# Patient Record
Sex: MALE | Race: WHITE | NOT HISPANIC OR LATINO | Employment: FULL TIME | URBAN - METROPOLITAN AREA
[De-identification: names, ages, dates, MRNs, and addresses within clinical notes are randomized per-mention and may not be internally consistent; named-entity substitution may affect disease eponyms.]

---

## 2018-04-06 ENCOUNTER — APPOINTMENT (OUTPATIENT)
Dept: GENERAL RADIOLOGY | Facility: HOSPITAL | Age: 53
End: 2018-04-06

## 2018-04-06 ENCOUNTER — HOSPITAL ENCOUNTER (EMERGENCY)
Facility: HOSPITAL | Age: 53
Discharge: HOME | End: 2018-04-06
Attending: FAMILY MEDICINE | Admitting: FAMILY MEDICINE

## 2018-04-06 VITALS
TEMPERATURE: 97.7 F | OXYGEN SATURATION: 99 % | HEIGHT: 72 IN | RESPIRATION RATE: 16 BRPM | BODY MASS INDEX: 28.44 KG/M2 | WEIGHT: 210 LBS | DIASTOLIC BLOOD PRESSURE: 92 MMHG | SYSTOLIC BLOOD PRESSURE: 149 MMHG | HEART RATE: 70 BPM

## 2018-04-06 DIAGNOSIS — M10.9 GOUTY ARTHRITIS: Primary | ICD-10-CM

## 2018-04-06 PROCEDURE — 99283 EMERGENCY DEPT VISIT LOW MDM: CPT

## 2018-04-06 PROCEDURE — 73560 X-RAY EXAM OF KNEE 1 OR 2: CPT

## 2018-04-06 RX ORDER — COLCHICINE 0.6 MG/1
0.6 TABLET ORAL DAILY
Qty: 10 TABLET | Refills: 0 | OUTPATIENT
Start: 2018-04-06 | End: 2020-03-13

## 2018-04-06 RX ORDER — CEPHALEXIN 500 MG/1
500 CAPSULE ORAL 3 TIMES DAILY
Qty: 28 CAPSULE | Refills: 0 | OUTPATIENT
Start: 2018-04-06 | End: 2020-03-13

## 2018-04-06 RX ORDER — PREDNISONE 20 MG/1
20 TABLET ORAL 3 TIMES DAILY
Qty: 30 TABLET | Refills: 0 | OUTPATIENT
Start: 2018-04-06 | End: 2020-03-13

## 2018-04-06 RX ORDER — HYDROCODONE BITARTRATE AND ACETAMINOPHEN 5; 325 MG/1; MG/1
1 TABLET ORAL EVERY 4 HOURS PRN
Qty: 12 TABLET | Refills: 0 | OUTPATIENT
Start: 2018-04-06 | End: 2020-03-13

## 2018-04-06 NOTE — ED NOTES
Pt states that he woke up yesterday with knee pain. Pt denies injury.      Kimberly Velázquez RN  04/06/18 [id number]

## 2018-04-06 NOTE — ED NOTES
Pt states that he feels better and ready to go. Pt AA0x3, ambulatory to waiting room with ABCs intact. Prescriptions in hand at discharge.      Kimberly Velázquez RN  04/06/18 [id number]

## 2018-04-06 NOTE — ED PROVIDER NOTES
" EMERGENCY DEPARTMENT ENCOUNTER    CHIEF COMPLAINT  Chief Complaint: Knee Pain  History given by: Patient  History limited by: None  Room Number: 53/53  PMD: No Known Provider      HPI:  Pt is a 52 y.o. male with a h/o gout who presents complaining of L knee pain that began yesterday morning and progressively worsened throughout the day. Ibuprofen and ice did not relieve pain. Patient denies excessive use prior to the beginning of pain. He has a history of at least 2 gout attacks but not in the knee.    Duration:  Since yesterday morning  Onset: gradual  Timing: constant  Location: L knee  Radiation: none stated  Quality: \"pain\"  Intensity/Severity: moderate  Progression: worsening  Associated Symptoms: none stated  Aggravating Factors: none stated  Alleviating Factors: none stated  Previous Episodes: none stated  Treatment before arrival: ibuprofen and ice have not relieved pain    PAST MEDICAL HISTORY  Active Ambulatory Problems     Diagnosis Date Noted   • No Active Ambulatory Problems     Resolved Ambulatory Problems     Diagnosis Date Noted   • No Resolved Ambulatory Problems     Past Medical History:   Diagnosis Date   • Gout        PAST SURGICAL HISTORY  Past Surgical History:   Procedure Laterality Date   • APPENDECTOMY     • VASECTOMY         FAMILY HISTORY  History reviewed. No pertinent family history.    SOCIAL HISTORY  Social History     Social History   • Marital status:      Spouse name: N/A   • Number of children: N/A   • Years of education: N/A     Occupational History   • Not on file.     Social History Main Topics   • Smoking status: Never Smoker   • Smokeless tobacco: Not on file   • Alcohol use Yes      Comment: occassionally   • Drug use: No   • Sexual activity: Not on file     Other Topics Concern   • Not on file     Social History Narrative   • No narrative on file       ALLERGIES  Review of patient's allergies indicates no known allergies.    REVIEW OF SYSTEMS  Review of Systems "   Constitutional: Negative for activity change, appetite change and fever.   HENT: Negative for congestion and sore throat.    Respiratory: Negative for cough and shortness of breath.    Cardiovascular: Negative for chest pain and leg swelling.   Gastrointestinal: Negative for abdominal pain, diarrhea and vomiting.   Genitourinary: Negative for decreased urine volume and dysuria.   Musculoskeletal: Positive for myalgias (L knee). Negative for neck pain.   Skin: Negative for rash and wound.   Neurological: Negative for weakness, numbness and headaches.   All other systems reviewed and are negative.      PHYSICAL EXAM  ED Triage Vitals   Temp Heart Rate Resp BP SpO2   04/06/18 0949 04/06/18 0949 04/06/18 0949 04/06/18 1035 04/06/18 0949   98.3 °F (36.8 °C) 86 18 (!) 182/87 100 %      Temp src Heart Rate Source Patient Position BP Location FiO2 (%)   04/06/18 0949 -- -- -- --   Tympanic           Physical Exam   Constitutional: No distress.   HENT:   Head: Normocephalic and atraumatic.   Eyes: EOM are normal.   Neck: Normal range of motion.   Pulmonary/Chest: No respiratory distress.   Abdominal: There is no tenderness.   Musculoskeletal: He exhibits no edema.   1 cm firm nodule on the lateral aspect of the L knee, possibly in the quadriceps tendon. ROM limited by pain. Ligaments seem stable. Area of redness over lateral patella with diffuse swelling.    Neurological: He is alert.   Skin: Skin is warm and dry.   Nursing note and vitals reviewed.      RADIOLOGY  XR Knee 1 or 2 View Left     No acute process        I ordered the above noted radiological studies. Interpreted by radiologist. Reviewed by me in PACS.       PROCEDURES  Procedures      PROGRESS AND CONSULTS  ED Course   11:52 AM  With a h/o gout x 2, suspect this is associated with gouty arthritis. Advised patient to f/u with an orthopedist. XR is unremarkable. Discussed plan to place an ace wrap and d/c with gout meds and an abx since I cannot r/o a septic  issue. Advised patient to apply ice to alleviate pain. Pt understands and agrees with the plan. All questions answered.    MEDICAL DECISION MAKING  Results were reviewed/discussed with the patient and they were also made aware of online access. Pt also made aware that some labs, such as cultures, will not be resulted during ER visit and follow up with PMD is necessary.     MDM  Number of Diagnoses or Management Options  Gouty arthritis:      Amount and/or Complexity of Data Reviewed  Tests in the radiology section of CPT®: ordered and reviewed (Radiology results are unremarkable)  Decide to obtain previous medical records or to obtain history from someone other than the patient: yes  Independent visualization of images, tracings, or specimens: yes    Patient Progress  Patient progress: stable         DIAGNOSIS  Final diagnoses:   Gouty arthritis       DISPOSITION  DISCHARGE    Patient discharged in stable condition.    Reviewed implications of results, diagnosis, meds, responsibility to follow up, warning signs and symptoms of possible worsening, potential complications and reasons to return to ER, including new or worsening sx.    Patient/Family voiced understanding of above instructions.    Discussed plan for discharge, as there is no emergent indication for admission. Patient referred to primary care provider for BP management due to today's BP. Pt/family is agreeable and understands need for follow up and repeat testing.  Pt is aware that discharge does not mean that nothing is wrong but it indicates no emergency is present that requires admission and they must continue care with follow-up as given below or physician of their choice.     FOLLOW-UP  Anish Levy MD  [address] [facility] [address]  [place] [id number] [phone]    Schedule an appointment as soon as possible for a visit in 3 days  For Follow up         Medication List      New Prescriptions    cephalexin 500 MG capsule  Commonly known  as:  KEFLEX  Take 1 capsule by mouth 3 (Three) Times a Day.     colchicine 0.6 MG tablet  Take 1 tablet by mouth Daily.     HYDROcodone-acetaminophen 5-325 MG per tablet  Commonly known as:  NORCO  Take 1 tablet by mouth Every 4 (Four) Hours As Needed (1).     predniSONE 20 MG tablet  Commonly known as:  DELTASONE  Take 1 tablet by mouth 3 (Three) Times a Day.              Latest Documented Vital Signs:  As of 12:01 PM  BP- 140/92 HR- 86 Temp- 98.3 °F (36.8 °C) (Tympanic) O2 sat- 100%    --  Documentation assistance provided by pepe Wolfe for Dr. Garvey.  Information recorded by the scribe was done at my direction and has been verified and validated by me.         Cyndi Wolfe  04/06/18 2439       Devyn Garvey MD  04/06/18 4433

## 2018-04-06 NOTE — ED TRIAGE NOTES
"Patient states \"My left knee started aching and hurting yesterday and got to the point where I couldn't walk on it.\" Patient ambulatory at triage and in NAD. Pt denies injury.  "

## 2020-03-13 ENCOUNTER — TELEPHONE (OUTPATIENT)
Dept: NEUROSURGERY | Facility: CLINIC | Age: 55
End: 2020-03-13

## 2020-03-13 ENCOUNTER — APPOINTMENT (OUTPATIENT)
Dept: CT IMAGING | Facility: HOSPITAL | Age: 55
End: 2020-03-13

## 2020-03-13 ENCOUNTER — OFFICE VISIT (OUTPATIENT)
Dept: NEUROSURGERY | Facility: CLINIC | Age: 55
End: 2020-03-13

## 2020-03-13 ENCOUNTER — HOSPITAL ENCOUNTER (EMERGENCY)
Facility: HOSPITAL | Age: 55
Discharge: HOME | End: 2020-03-13
Attending: EMERGENCY MEDICINE | Admitting: EMERGENCY MEDICINE

## 2020-03-13 ENCOUNTER — APPOINTMENT (OUTPATIENT)
Dept: GENERAL RADIOLOGY | Facility: HOSPITAL | Age: 55
End: 2020-03-13

## 2020-03-13 VITALS
HEIGHT: 72 IN | SYSTOLIC BLOOD PRESSURE: 156 MMHG | HEART RATE: 67 BPM | WEIGHT: 230 LBS | RESPIRATION RATE: 22 BRPM | OXYGEN SATURATION: 95 % | TEMPERATURE: 97.1 F | DIASTOLIC BLOOD PRESSURE: 89 MMHG | BODY MASS INDEX: 31.15 KG/M2

## 2020-03-13 VITALS
HEIGHT: 72 IN | WEIGHT: 230 LBS | DIASTOLIC BLOOD PRESSURE: 82 MMHG | HEART RATE: 70 BPM | BODY MASS INDEX: 31.15 KG/M2 | SYSTOLIC BLOOD PRESSURE: 138 MMHG

## 2020-03-13 DIAGNOSIS — M54.12 LEFT CERVICAL RADICULOPATHY: ICD-10-CM

## 2020-03-13 DIAGNOSIS — M54.12 CERVICAL RADICULOPATHY: Primary | ICD-10-CM

## 2020-03-13 DIAGNOSIS — M50.30 DEGENERATIVE DISC DISEASE, CERVICAL: Primary | ICD-10-CM

## 2020-03-13 LAB
ALBUMIN SERPL-MCNC: 4.4 G/DL (ref 3.5–5.2)
ALBUMIN/GLOB SERPL: 1.9 G/DL
ALP SERPL-CCNC: 53 U/L (ref 39–117)
ALT SERPL W P-5'-P-CCNC: 47 U/L (ref 1–41)
ANION GAP SERPL CALCULATED.3IONS-SCNC: 12.7 MMOL/L (ref 5–15)
AST SERPL-CCNC: 33 U/L (ref 1–40)
BASOPHILS # BLD AUTO: 0.03 10*3/MM3 (ref 0–0.2)
BASOPHILS NFR BLD AUTO: 0.5 % (ref 0–1.5)
BILIRUB SERPL-MCNC: 0.6 MG/DL (ref 0.2–1.2)
BUN BLD-MCNC: 13 MG/DL (ref 6–20)
BUN/CREAT SERPL: 14 (ref 7–25)
CALCIUM SPEC-SCNC: 9 MG/DL (ref 8.6–10.5)
CHLORIDE SERPL-SCNC: 104 MMOL/L (ref 98–107)
CO2 SERPL-SCNC: 23.3 MMOL/L (ref 22–29)
CREAT BLD-MCNC: 0.93 MG/DL (ref 0.76–1.27)
DEPRECATED RDW RBC AUTO: 48.6 FL (ref 37–54)
EOSINOPHIL # BLD AUTO: 0.08 10*3/MM3 (ref 0–0.4)
EOSINOPHIL NFR BLD AUTO: 1.3 % (ref 0.3–6.2)
ERYTHROCYTE [DISTWIDTH] IN BLOOD BY AUTOMATED COUNT: 13.6 % (ref 12.3–15.4)
GFR SERPL CREATININE-BSD FRML MDRD: 84 ML/MIN/1.73
GLOBULIN UR ELPH-MCNC: 2.3 GM/DL
GLUCOSE BLD-MCNC: 124 MG/DL (ref 65–99)
HCT VFR BLD AUTO: 48.5 % (ref 37.5–51)
HGB BLD-MCNC: 16.5 G/DL (ref 13–17.7)
IMM GRANULOCYTES # BLD AUTO: 0.01 10*3/MM3 (ref 0–0.05)
IMM GRANULOCYTES NFR BLD AUTO: 0.2 % (ref 0–0.5)
LYMPHOCYTES # BLD AUTO: 1.83 10*3/MM3 (ref 0.7–3.1)
LYMPHOCYTES NFR BLD AUTO: 30 % (ref 19.6–45.3)
MCH RBC QN AUTO: 32.9 PG (ref 26.6–33)
MCHC RBC AUTO-ENTMCNC: 34 G/DL (ref 31.5–35.7)
MCV RBC AUTO: 96.8 FL (ref 79–97)
MONOCYTES # BLD AUTO: 0.52 10*3/MM3 (ref 0.1–0.9)
MONOCYTES NFR BLD AUTO: 8.5 % (ref 5–12)
NEUTROPHILS # BLD AUTO: 3.63 10*3/MM3 (ref 1.7–7)
NEUTROPHILS NFR BLD AUTO: 59.5 % (ref 42.7–76)
NRBC BLD AUTO-RTO: 0 /100 WBC (ref 0–0.2)
PLATELET # BLD AUTO: 238 10*3/MM3 (ref 140–450)
PMV BLD AUTO: 10.4 FL (ref 6–12)
POTASSIUM BLD-SCNC: 4.4 MMOL/L (ref 3.5–5.2)
PROT SERPL-MCNC: 6.7 G/DL (ref 6–8.5)
RBC # BLD AUTO: 5.01 10*6/MM3 (ref 4.14–5.8)
SODIUM BLD-SCNC: 140 MMOL/L (ref 136–145)
TROPONIN T SERPL-MCNC: <0.01 NG/ML (ref 0–0.03)
WBC NRBC COR # BLD: 6.1 10*3/MM3 (ref 3.4–10.8)

## 2020-03-13 PROCEDURE — 25010000002 MORPHINE PER 10 MG: Performed by: EMERGENCY MEDICINE

## 2020-03-13 PROCEDURE — 96375 TX/PRO/DX INJ NEW DRUG ADDON: CPT

## 2020-03-13 PROCEDURE — 84484 ASSAY OF TROPONIN QUANT: CPT | Performed by: EMERGENCY MEDICINE

## 2020-03-13 PROCEDURE — 71046 X-RAY EXAM CHEST 2 VIEWS: CPT

## 2020-03-13 PROCEDURE — 25010000002 ONDANSETRON PER 1 MG: Performed by: EMERGENCY MEDICINE

## 2020-03-13 PROCEDURE — 93005 ELECTROCARDIOGRAM TRACING: CPT | Performed by: EMERGENCY MEDICINE

## 2020-03-13 PROCEDURE — 72125 CT NECK SPINE W/O DYE: CPT

## 2020-03-13 PROCEDURE — 99283 EMERGENCY DEPT VISIT LOW MDM: CPT

## 2020-03-13 PROCEDURE — 80053 COMPREHEN METABOLIC PANEL: CPT | Performed by: EMERGENCY MEDICINE

## 2020-03-13 PROCEDURE — 85025 COMPLETE CBC W/AUTO DIFF WBC: CPT | Performed by: EMERGENCY MEDICINE

## 2020-03-13 PROCEDURE — 99204 OFFICE O/P NEW MOD 45 MIN: CPT | Performed by: NURSE PRACTITIONER

## 2020-03-13 PROCEDURE — 96376 TX/PRO/DX INJ SAME DRUG ADON: CPT

## 2020-03-13 PROCEDURE — 96374 THER/PROPH/DIAG INJ IV PUSH: CPT

## 2020-03-13 RX ORDER — OXYCODONE AND ACETAMINOPHEN 10; 325 MG/1; MG/1
1 TABLET ORAL EVERY 4 HOURS PRN
Qty: 18 TABLET | Refills: 0 | Status: SHIPPED | OUTPATIENT
Start: 2020-03-13 | End: 2021-07-02

## 2020-03-13 RX ORDER — NAPROXEN SODIUM 220 MG
440 TABLET ORAL 2 TIMES DAILY PRN
COMMUNITY
End: 2021-07-02

## 2020-03-13 RX ORDER — BACLOFEN 10 MG/1
10 TABLET ORAL 2 TIMES DAILY
Qty: 60 TABLET | Refills: 2 | Status: SHIPPED | OUTPATIENT
Start: 2020-03-13 | End: 2021-07-02

## 2020-03-13 RX ORDER — ONDANSETRON 2 MG/ML
4 INJECTION INTRAMUSCULAR; INTRAVENOUS ONCE
Status: COMPLETED | OUTPATIENT
Start: 2020-03-13 | End: 2020-03-13

## 2020-03-13 RX ORDER — SODIUM CHLORIDE 0.9 % (FLUSH) 0.9 %
10 SYRINGE (ML) INJECTION AS NEEDED
Status: DISCONTINUED | OUTPATIENT
Start: 2020-03-13 | End: 2020-03-13 | Stop reason: HOSPADM

## 2020-03-13 RX ORDER — MORPHINE SULFATE 2 MG/ML
4 INJECTION, SOLUTION INTRAMUSCULAR; INTRAVENOUS ONCE
Status: COMPLETED | OUTPATIENT
Start: 2020-03-13 | End: 2020-03-13

## 2020-03-13 RX ORDER — METHYLPREDNISOLONE 4 MG/1
TABLET ORAL
Qty: 1 EACH | Refills: 0 | Status: SHIPPED | OUTPATIENT
Start: 2020-03-13 | End: 2020-03-23

## 2020-03-13 RX ADMIN — MORPHINE SULFATE 4 MG: 2 INJECTION, SOLUTION INTRAMUSCULAR; INTRAVENOUS at 05:48

## 2020-03-13 RX ADMIN — MORPHINE SULFATE 4 MG: 2 INJECTION, SOLUTION INTRAMUSCULAR; INTRAVENOUS at 06:54

## 2020-03-13 RX ADMIN — ONDANSETRON 4 MG: 2 INJECTION INTRAMUSCULAR; INTRAVENOUS at 05:48

## 2020-03-13 NOTE — TELEPHONE ENCOUNTER
Per Dr AGUIRRE , he needs to see extender today to get MRI ordered.  Spoke with Chelsie Beatty and she will see him today at 1:15 pm- patient informed
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
## 2020-03-13 NOTE — ED TRIAGE NOTES
Pt to ED from home with reports of waking up two hours ago with left sided chest pain, left arm pain, and numbness to left hand.  Pt also c/o SOA.

## 2020-03-13 NOTE — ED PROVIDER NOTES
EMERGENCY DEPARTMENT ENCOUNTER    CHIEF COMPLAINT  Chief Complaint: arm pain  History given by: patient  History limited by: none  Room Number: 15/15  PMD: Provider, No Known      HPI:  Pt is a 54 y.o. male who presents complaining of L arm pain that has been intermittent for the past 2 weeks, but became constant and acutely worse about 2 hours PTA. The pain will radiate into his neck. Confirms associated numbness of the L arm, from his fingers to his wrist. Denies weakness. Pain is worsened with leaning his head back. No alleviating factors. Denies CP and SOA. He also notes mild nausea, but denies vomiting. Denies fever and chills. Denies personal cardiac Hx. Confirms family Hx of heart disease. He has been taking Aleve with some relief of his pain. Not currently on blood thinners. There are no other complaints.     PAST MEDICAL HISTORY  Active Ambulatory Problems     Diagnosis Date Noted   • No Active Ambulatory Problems     Resolved Ambulatory Problems     Diagnosis Date Noted   • No Resolved Ambulatory Problems     Past Medical History:   Diagnosis Date   • Gout    • Hypertension        PAST SURGICAL HISTORY  Past Surgical History:   Procedure Laterality Date   • APPENDECTOMY     • VASECTOMY         FAMILY HISTORY  History reviewed. No pertinent family history.    SOCIAL HISTORY  Social History     Socioeconomic History   • Marital status:      Spouse name: Not on file   • Number of children: Not on file   • Years of education: Not on file   • Highest education level: Not on file   Tobacco Use   • Smoking status: Never Smoker   • Smokeless tobacco: Never Used   Substance and Sexual Activity   • Alcohol use: Yes     Comment: occassionally   • Drug use: No       ALLERGIES  Patient has no known allergies.    REVIEW OF SYSTEMS  Review of Systems   Constitutional: Negative for activity change, appetite change and fever.   HENT: Negative for congestion and sore throat.    Eyes: Negative.    Respiratory:  Negative for cough and shortness of breath.    Cardiovascular: Negative for chest pain and leg swelling.   Gastrointestinal: Positive for nausea. Negative for abdominal pain, diarrhea and vomiting.   Endocrine: Negative.    Genitourinary: Negative for decreased urine volume and dysuria.   Musculoskeletal: Negative for neck pain.        (+) LUE pain   Skin: Negative for rash and wound.   Allergic/Immunologic: Negative.    Neurological: Positive for numbness (L hand). Negative for weakness and headaches.   Hematological: Negative.    Psychiatric/Behavioral: Negative.    All other systems reviewed and are negative.      PHYSICAL EXAM  ED Triage Vitals [03/13/20 0526]   Temp Heart Rate Resp BP SpO2   97.1 °F (36.2 °C) 95 22 -- 100 %      Temp src Heart Rate Source Patient Position BP Location FiO2 (%)   Tympanic -- -- -- --       Physical Exam   Constitutional: He is oriented to person, place, and time. No distress.   HENT:   Head: Normocephalic and atraumatic.   Eyes: Pupils are equal, round, and reactive to light. EOM are normal.   Neck: Normal range of motion. Neck supple. Muscular tenderness present.   Mild posterior neck tenderness down into the trapezius. Increased pain in the neck and LUE with extension of the neck.    Cardiovascular: Normal rate, regular rhythm and normal heart sounds.   Pulses:       Radial pulses are 2+ on the right side, and 2+ on the left side.   Pulmonary/Chest: Effort normal and breath sounds normal. No respiratory distress. He exhibits no tenderness.   Abdominal: Soft. There is no tenderness. There is no rebound and no guarding.   Musculoskeletal: Normal range of motion. He exhibits no edema.   Extremities are unremarkable.    Neurological: He is alert and oriented to person, place, and time. He has normal sensation and normal strength.   Skin: Skin is warm and dry.   Psychiatric: Affect normal. His mood appears anxious.   Nursing note and vitals reviewed.      LAB RESULTS  Lab Results  (last 24 hours)     Procedure Component Value Units Date/Time    Protime-INR [[id number]] Updated:  03/13/20 0620    Specimen:  Blood     CBC & Differential [[id number]] Collected:  03/13/20 0553    Specimen:  Blood Updated:  03/13/20 0612    Narrative:       The following orders were created for panel order CBC & Differential.  Procedure                               Abnormality         Status                     ---------                               -----------         ------                     CBC Auto Differential[[id number]]        Normal              Final result                 Please view results for these tests on the individual orders.    Comprehensive Metabolic Panel [[id number]]  (Abnormal) Collected:  03/13/20 0553    Specimen:  Blood Updated:  03/13/20 0632     Glucose 124 mg/dL      BUN 13 mg/dL      Creatinine 0.93 mg/dL      Sodium 140 mmol/L      Potassium 4.4 mmol/L      Chloride 104 mmol/L      CO2 23.3 mmol/L      Calcium 9.0 mg/dL      Total Protein 6.7 g/dL      Albumin 4.40 g/dL      ALT (SGPT) 47 U/L      AST (SGOT) 33 U/L      Alkaline Phosphatase 53 U/L      Total Bilirubin 0.6 mg/dL      eGFR Non African Amer 84 mL/min/1.73      Globulin 2.3 gm/dL      A/G Ratio 1.9 g/dL      BUN/Creatinine Ratio 14.0     Anion Gap 12.7 mmol/L     Narrative:       GFR Normal >60  Chronic Kidney Disease <60  Kidney Failure <15      Troponin [[id number]]  (Normal) Collected:  03/13/20 0553    Specimen:  Blood Updated:  03/13/20 0630     Troponin T <0.010 ng/mL     Narrative:       Troponin T Reference Range:  <= 0.03 ng/mL-   Negative for AMI  >0.03 ng/mL-     Abnormal for myocardial necrosis.  Clinicians would have to utilize clinical acumen, EKG, Troponin and serial changes to determine if it is an Acute Myocardial Infarction or myocardial injury due to an underlying chronic condition.       Results may be falsely decreased if patient taking Biotin.      CBC Auto Differential [[id number]]  (Normal)  Collected:  03/13/20 0553    Specimen:  Blood Updated:  03/13/20 0612     WBC 6.10 10*3/mm3      RBC 5.01 10*6/mm3      Hemoglobin 16.5 g/dL      Hematocrit 48.5 %      MCV 96.8 fL      MCH 32.9 pg      MCHC 34.0 g/dL      RDW 13.6 %      RDW-SD 48.6 fl      MPV 10.4 fL      Platelets 238 10*3/mm3      Neutrophil % 59.5 %      Lymphocyte % 30.0 %      Monocyte % 8.5 %      Eosinophil % 1.3 %      Basophil % 0.5 %      Immature Grans % 0.2 %      Neutrophils, Absolute 3.63 10*3/mm3      Lymphocytes, Absolute 1.83 10*3/mm3      Monocytes, Absolute 0.52 10*3/mm3      Eosinophils, Absolute 0.08 10*3/mm3      Basophils, Absolute 0.03 10*3/mm3      Immature Grans, Absolute 0.01 10*3/mm3      nRBC 0.0 /100 WBC           I ordered the above labs and reviewed the results    RADIOLOGY  Xr Chest 2 View    Result Date: 3/13/2020  EXAMINATION: 2 VIEWS OF THE CHEST  HISTORY: 54-year-old male with a history of left arm, left shoulder and neck pain.  FINDINGS: PA and lateral chest radiographs were obtained. No prior chest radiograph is available for comparison. The lungs are normal in volume and clear. The cardiomediastinal silhouette and pulmonary vasculature appear normal. No bony abnormality of the chest is appreciated.      Negative chest radiographs.  This report was finalized on 3/13/2020 6:55 AM by Dr. Arturo Rosa M.D.      Ct Cervical Spine Without Contrast    Result Date: 3/13/2020  CT CERVICAL SPINE WITHOUT CONTRAST  HISTORY: Neck pain, left radiculopathy.  COMPARISON: None.  FINDINGS: There is moderate-to-severe loss of disc height at C5-C6 and C6-C7. There is reversal of normal cervical lordosis with apex at the level of C5-C6.  C2-C3: There is a small central disc bulge with no evidence of herniation.  C3-C4: There is a broad-based disc osteophyte complex which is more prominent to the left. There is at least moderate neural foraminal compromise on the left secondary to uncovertebral degenerative disease and  extension of disc osteophyte complex into the neural foramen.  C4-C5: There is mild-to-moderate canal stenosis secondary to a broad-based central disc osteophyte complex. There is mild and moderate neural foraminal compromise on the left and right respectively secondary to uncovertebral degenerative disease.  C5-C6: There is moderate canal stenosis centrally secondary to a broad-based disc osteophyte complex. There is moderate-to-severe neural foraminal compromise bilaterally secondary to uncovertebral degenerative disease and extension of disc osteophyte complex into the neural foramen.  C6-C7: A broad-based disc osteophyte complex is present which is more prominent to the left. Beam hardening artifact from the patient's shoulders limits evaluation of the intraspinal contents. There is moderate-to-severe neural foraminal compromise bilaterally secondary to uncovertebral degenerative disease.  C7-T1: Beam hardening artifact from the patient's shoulders limits evaluation of the intraspinal contents. There is no evidence of uncovertebral degenerative disease.  There is no evidence of fracture.      Multilevel degenerative disease involving the cervical spine as described in detail above with reversal of the normal cervical lordosis, loss of disc height and multilevel neural foraminal compromise. Neural foraminal compromise on the left is most prominent at C3-C4, C5-C6 and C6-C7. There is artifact limiting evaluation of the intraspinal contents at C6-C7 and C7-T1. Further evaluation could be performed with MRI examination of the cervical spine as indicated.  The above information was called to and discussed with Dr. Bass.    Radiation dose reduction techniques were utilized, including automated exposure control and exposure modulation based on body size.         I ordered the above noted radiological studies. Reviewed by me. See dictation for official radiology interpretation.      PROCEDURES  Procedures  EKG           EKG time: 0532  Rhythm/Rate: NSR 82  P waves and OR: nml  QRS, axis: nml   ST and T waves: nonspecific ST changes     Interpreted Contemporaneously by me, independently viewed  No prior       PROGRESS AND CONSULTS     0705: Discussed patient with Dr. Bermudez (Neurosurgery) who will see patient in office today.    MEDICAL DECISION MAKING  Results were reviewed/discussed with the patient and they were also made aware of online access. Pt also made aware that some labs, such as cultures, will not be resulted during ER visit and follow up with PMD is necessary.     MDM  Number of Diagnoses or Management Options     Amount and/or Complexity of Data Reviewed  Clinical lab tests: ordered and reviewed  Tests in the radiology section of CPT®: ordered and reviewed  Tests in the medicine section of CPT®: ordered and reviewed (See EKG note.)  Decide to obtain previous medical records or to obtain history from someone other than the patient: yes  Independent visualization of images, tracings, or specimens: yes    Patient Progress  Patient progress: stable         DIAGNOSIS  Final diagnoses:   Cervical radiculopathy       DISPOSITION  DISCHARGED    Latest Documented Vital Signs:  As of 07:10  BP- 149/89 HR- 67 Temp- 97.1 °F (36.2 °C) (Tympanic) O2 sat- 95%    --  Documentation assistance provided by pepe Wood for Dr. Kali MD.  Information recorded by the scribe was done at my direction and has been verified and validated by me.       Deejay Wood  03/13/20 0559       Severiano Bass MD  03/13/20 0757

## 2020-03-15 ENCOUNTER — HOSPITAL ENCOUNTER (OUTPATIENT)
Dept: MRI IMAGING | Facility: HOSPITAL | Age: 55
Discharge: HOME | End: 2020-03-15
Admitting: NURSE PRACTITIONER

## 2020-03-15 DIAGNOSIS — M50.30 DEGENERATIVE DISC DISEASE, CERVICAL: ICD-10-CM

## 2020-03-15 DIAGNOSIS — M54.12 LEFT CERVICAL RADICULOPATHY: ICD-10-CM

## 2020-03-15 PROCEDURE — 72141 MRI NECK SPINE W/O DYE: CPT

## 2020-03-17 NOTE — PROGRESS NOTES
Subjective   Patient ID: Orion Pop is a 54 y.o. male is here today for follow-up to discuss cervical MRI results.  He has not had PT, MUSHTAQ or pain management.  He is in the process of completing MDP which has helped, he will be finished tomorrow. He has Percocet at home, but has not taken in 3 - 4 days, he is taking Baclofen BID    Patient was last seen 3.13.20 for left arm and neck pain.  Patient was seen in [facility] ER 3.13.20 for neck and left arm pain and was instructed to follow up in our office.    Patient states that he is currently having constant mild to moderate left arm pain, he is having very little neck pain and no arm weakness.  He has constant NT left hand/fingers.  He denies problems with his balance and gait and no urinary incontinence.    The patient is with his wife. 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 He has never really had any neck or arm symptoms. Late last week he woke up from sleep with severe left scapular and arm pain and some neck pian. He went to the emergency room and a CT scan was done which did show some foraminal stenosis at multiple cervical levels. He was given some pain medications and he saw that very day our nurse practitioner, Chelsie Beatty, who ordered a cervical MRI and gave a Medrol Dosepak. He returns. The pain is somewhat better although it is not gone. He has no motor deficits. I could not elicit any Spurling's sign although he said that turning his head did reproduce pain initially. He is not myelopathic. He does have root compression as described below at C5-C6 and C6-C7. I think it is reasonable to start with conservative treatments. I think it probably would be premature to start physical therapy since he is still pretty uncomfortable and has difficulty sleeping. Will try a single cervical epidural block and have him come back in 7-10 days. An off work statement will be given. If he is feeling better we can start some physical therapy at that  time and move forward with nonoperative treatment. He knows that surgery is a backup but I am reasonably optimistic that we can treat it this way. We can also consider gabapentin at some point later but the pain medications made him very sleepy and he might not tolerate that medicine.       Arm Pain    The pain is present in the left forearm and upper left arm. The pain does not radiate. The pain is at a severity of 5/10. The pain is moderate. The pain has been constant since the incident. Associated symptoms include numbness.       The following portions of the patient's history were reviewed and updated as appropriate: allergies, current medications, past family history, past medical history, past social history, past surgical history and problem list.    Review of Systems   Constitutional: Negative.    HENT: Negative.    Eyes: Negative.    Respiratory: Negative.    Cardiovascular: Negative.    Gastrointestinal: Negative.    Endocrine: Negative.    Genitourinary: Negative.  Negative for urgency.   Musculoskeletal: Negative for arthralgias, gait problem, joint swelling, myalgias, neck pain and neck stiffness.   Allergic/Immunologic: Negative.    Neurological: Positive for numbness. Negative for weakness.   Hematological: Negative.    Psychiatric/Behavioral: Negative.        Objective   Physical Exam   Constitutional: He is oriented to person, place, and time. He appears well-developed and well-nourished.   HENT:   Head: Normocephalic and atraumatic.   Eyes: Pupils are equal, round, and reactive to light. Conjunctivae and EOM are normal.   Fundoscopic exam:       The right eye shows no papilledema. The right eye shows venous pulsations.        The left eye shows no papilledema. The left eye shows venous pulsations.   Neck: Carotid bruit is not present.   Neurological: He is oriented to person, place, and time. He has a normal Finger-Nose-Finger Test and a normal Heel to Shin Test. Gait normal.   Reflex Scores:        Tricep reflexes are 2+ on the right side and 2+ on the left side.       Bicep reflexes are 2+ on the right side and 2+ on the left side.       Brachioradialis reflexes are 2+ on the right side and 2+ on the left side.       Patellar reflexes are 2+ on the right side and 2+ on the left side.       Achilles reflexes are 2+ on the right side and 2+ on the left side.  Psychiatric: His speech is normal.     Neurologic Exam     Mental Status   Oriented to person, place, and time.   Registration of memory: Good recent and remote memory.   Attention: normal. Concentration: normal.   Speech: speech is normal   Level of consciousness: alert  Knowledge: consistent with education.     Cranial Nerves     CN II   Visual fields full to confrontation.   Visual acuity: normal    CN III, IV, VI   Pupils are equal, round, and reactive to light.  Extraocular motions are normal.     CN V   Facial sensation intact.   Right corneal reflex: normal  Left corneal reflex: normal    CN VII   Facial expression full, symmetric.   Right facial weakness: none  Left facial weakness: none    CN VIII   Hearing: intact    CN IX, X   Palate: symmetric    CN XI   Right sternocleidomastoid strength: normal  Left sternocleidomastoid strength: normal    CN XII   Tongue: not atrophic  Tongue deviation: none    Motor Exam   Muscle bulk: normal  Right arm tone: normal  Left arm tone: normal  Right leg tone: normal  Left leg tone: normal    Strength   Strength 5/5 except as noted.     Sensory Exam   Light touch normal.     Gait, Coordination, and Reflexes     Gait  Gait: normal    Coordination   Finger to nose coordination: normal  Heel to shin coordination: normal    Reflexes   Right brachioradialis: 2+  Left brachioradialis: 2+  Right biceps: 2+  Left biceps: 2+  Right triceps: 2+  Left triceps: 2+  Right patellar: 2+  Left patellar: 2+  Right achilles: 2+  Left achilles: 2+  Right : 2+  Left : 2+      Assessment/Plan   Independent Review of  Radiographic Studies:    I reviewed the cervical MRI done on  3/15/2020 which shows osteophytic cervical disc disease with left-sided root compression at C5-C6 and C6-C7.  There is even a little bit at C3-C4 with left-sided root compression.  Agree with the report.    Medical Decision Making:    In the absence of motor deficits, will try conservative treatment.  Will get a cervical epidural block and have him come back in about 7 to 10 days.  An off work statement was given.  He will take Aleve after he is finished the steroid pack.  If he is better then we can start some physical therapy.      Orion was seen today for arm pain and numbness.    Diagnoses and all orders for this visit:    Cervical disc disorder at C5-C6 level with radiculopathy  -     Epidural Block    Cervical disc disorder at C6-C7 level with radiculopathy  -     Epidural Block      Return in about 10 days (around 3/28/2020) for give off work statement until next visit.

## 2020-03-18 ENCOUNTER — OFFICE VISIT (OUTPATIENT)
Dept: NEUROSURGERY | Facility: CLINIC | Age: 55
End: 2020-03-18

## 2020-03-18 VITALS
HEIGHT: 72 IN | TEMPERATURE: 97.9 F | HEART RATE: 74 BPM | DIASTOLIC BLOOD PRESSURE: 78 MMHG | SYSTOLIC BLOOD PRESSURE: 156 MMHG | BODY MASS INDEX: 31.19 KG/M2

## 2020-03-18 DIAGNOSIS — M50.122 CERVICAL DISC DISORDER AT C5-C6 LEVEL WITH RADICULOPATHY: Primary | ICD-10-CM

## 2020-03-18 DIAGNOSIS — M50.123 CERVICAL DISC DISORDER AT C6-C7 LEVEL WITH RADICULOPATHY: ICD-10-CM

## 2020-03-18 PROCEDURE — 99214 OFFICE O/P EST MOD 30 MIN: CPT | Performed by: NEUROLOGICAL SURGERY

## 2020-03-19 ENCOUNTER — APPOINTMENT (OUTPATIENT)
Dept: MRI IMAGING | Facility: HOSPITAL | Age: 55
End: 2020-03-19

## 2020-03-23 ENCOUNTER — HOSPITAL ENCOUNTER (OUTPATIENT)
Dept: PAIN MEDICINE | Facility: HOSPITAL | Age: 55
Discharge: HOME | End: 2020-03-23
Admitting: ANESTHESIOLOGY

## 2020-03-23 ENCOUNTER — ANESTHESIA EVENT (OUTPATIENT)
Dept: PAIN MEDICINE | Facility: HOSPITAL | Age: 55
End: 2020-03-23

## 2020-03-23 ENCOUNTER — ANESTHESIA (OUTPATIENT)
Dept: PAIN MEDICINE | Facility: HOSPITAL | Age: 55
End: 2020-03-23

## 2020-03-23 ENCOUNTER — HOSPITAL ENCOUNTER (OUTPATIENT)
Dept: GENERAL RADIOLOGY | Facility: HOSPITAL | Age: 55
Discharge: HOME | End: 2020-03-23

## 2020-03-23 VITALS
TEMPERATURE: 98.4 F | BODY MASS INDEX: 31.15 KG/M2 | WEIGHT: 230 LBS | SYSTOLIC BLOOD PRESSURE: 135 MMHG | RESPIRATION RATE: 11 BRPM | OXYGEN SATURATION: 96 % | HEIGHT: 72 IN | DIASTOLIC BLOOD PRESSURE: 83 MMHG | HEART RATE: 60 BPM

## 2020-03-23 DIAGNOSIS — M50.123 CERVICAL DISC DISORDER AT C6-C7 LEVEL WITH RADICULOPATHY: Primary | ICD-10-CM

## 2020-03-23 DIAGNOSIS — R52 PAIN: ICD-10-CM

## 2020-03-23 PROCEDURE — 77003 FLUOROGUIDE FOR SPINE INJECT: CPT

## 2020-03-23 PROCEDURE — 25010000002 DEXAMETHASONE SODIUM PHOSPHATE 10 MG/ML SOLUTION: Performed by: ANESTHESIOLOGY

## 2020-03-23 PROCEDURE — 25010000002 MIDAZOLAM PER 1 MG: Performed by: ANESTHESIOLOGY

## 2020-03-23 PROCEDURE — 0 IOPAMIDOL 41 % SOLUTION: Performed by: ANESTHESIOLOGY

## 2020-03-23 PROCEDURE — C1755 CATHETER, INTRASPINAL: HCPCS

## 2020-03-23 RX ORDER — SODIUM CHLORIDE 0.9 % (FLUSH) 0.9 %
3 SYRINGE (ML) INJECTION EVERY 12 HOURS SCHEDULED
Status: DISCONTINUED | OUTPATIENT
Start: 2020-03-23 | End: 2020-03-24 | Stop reason: HOSPADM

## 2020-03-23 RX ORDER — LIDOCAINE HYDROCHLORIDE 10 MG/ML
1 INJECTION, SOLUTION INFILTRATION; PERINEURAL ONCE
Status: DISCONTINUED | OUTPATIENT
Start: 2020-03-23 | End: 2020-03-24 | Stop reason: HOSPADM

## 2020-03-23 RX ORDER — DEXAMETHASONE SODIUM PHOSPHATE 10 MG/ML
10 INJECTION, SOLUTION INTRAMUSCULAR; INTRAVENOUS ONCE
Status: COMPLETED | OUTPATIENT
Start: 2020-03-23 | End: 2020-03-23

## 2020-03-23 RX ORDER — SODIUM CHLORIDE 0.9 % (FLUSH) 0.9 %
3-10 SYRINGE (ML) INJECTION AS NEEDED
Status: DISCONTINUED | OUTPATIENT
Start: 2020-03-23 | End: 2020-03-24 | Stop reason: HOSPADM

## 2020-03-23 RX ORDER — FENTANYL CITRATE 50 UG/ML
50 INJECTION, SOLUTION INTRAMUSCULAR; INTRAVENOUS AS NEEDED
Status: DISCONTINUED | OUTPATIENT
Start: 2020-03-23 | End: 2020-03-24 | Stop reason: HOSPADM

## 2020-03-23 RX ORDER — MIDAZOLAM HYDROCHLORIDE 1 MG/ML
1 INJECTION INTRAMUSCULAR; INTRAVENOUS AS NEEDED
Status: DISCONTINUED | OUTPATIENT
Start: 2020-03-23 | End: 2020-03-24 | Stop reason: HOSPADM

## 2020-03-23 RX ADMIN — MIDAZOLAM 1 MG: 1 INJECTION INTRAMUSCULAR; INTRAVENOUS at 09:20

## 2020-03-23 RX ADMIN — IOPAMIDOL 20 ML: 408 INJECTION, SOLUTION INTRATHECAL at 09:27

## 2020-03-23 RX ADMIN — MIDAZOLAM 1 MG: 1 INJECTION INTRAMUSCULAR; INTRAVENOUS at 09:23

## 2020-03-23 RX ADMIN — DEXAMETHASONE SODIUM PHOSPHATE 10 MG: 10 INJECTION, SOLUTION INTRAMUSCULAR; INTRAVENOUS at 09:27

## 2020-03-23 NOTE — ANESTHESIA PROCEDURE NOTES
PAIN Epidural block    Pre-sedation assessment completed: 3/23/2020 9:03 AM    Patient reassessed immediately prior to procedure    Patient location during procedure: pain clinic  Start Time: 3/23/2020 9:21 AM  Stop Time: 3/23/2020 9:29 AM  Indication:procedure for pain  Performed By  Anesthesiologist: Francisco Kan MD  Preanesthetic Checklist  Completed: patient identified, site marked, surgical consent, pre-op evaluation, timeout performed, IV checked, risks and benefits discussed and monitors and equipment checked  Additional Notes  Post-Op Diagnosis Codes:     * Disorder of intervertebral disc of cervical spine (M50.90)     * Cervical radiculopathy (M54.12)    The patient was observed in recovery with no evidence of neurological deficits or other problems. All questions were answered. The patient was discharged with appropriate instructions.  Prep:  Pt Position:prone  Sterile Tech:cap, gloves, mask and sterile barrier  Prep:chlorhexidine gluconate and isopropyl alcohol  Monitoring:blood pressure monitoring, continuous pulse oximetry and EKG  Procedure:Sedation: yes     Approach:left paramedian  Guidance: fluoroscopy  Location:cervical  Level:6-7  Needle Type:Tuohy  Needle Gauge:20 G  Aspiration:negative  Medications:  Analgesia: Preservative Free Dexamethasone 10mg.  Preservative Free Saline:2mL  Isovue:1mL  Comments:Appropriate epidural spread of contrast.   Post Assessment:  Post-procedure: Dressing per nursing.  Pt Tolerance:patient tolerated the procedure well with no apparent complications  Complications:no

## 2020-03-23 NOTE — H&P
CHIEF COMPLAINT:  MAC pain        HISTORY OF PRESENT ILLNESS:  This patient is complaining of neck pain which radiates down his left upper extremity including numbness and tingling in the left hand and fingers.  It ranges between a 4 and 8 out of 10 on the pain scale.  The pain is described as aching, burning,, crushing, intermittent, numb, tingling, sharp, stabbing, and throbbing in nature. The pain is exacerbated by nothing in particular, and relieved by pain medication and certain activities.  The patient has tried conservative therapy for greater than 6 weeks including: Over-the-counter medication, prescription medication, and steroids.  The patient is scheduled to start physical therapy soon.  The pain is significantly decreasing the patient's Activities of Daily Living.  Diagnostic imaging including an MRI of the cervical spine from 3/15/2020 shows moderate to severe bilateral bony foraminal narrowing at C5-6 and C6-7 possibly affecting the exiting C6 and C7 nerve roots bilaterally.  Full dictation by the radiologist is available in the chart.    This patient was referred to our clinic by Dr. Lennox Bermudez for cervical epidural steroid injections.          PAST MEDICAL HISTORY:  Current Outpatient Medications on File Prior to Encounter   Medication Sig Dispense Refill   • naproxen sodium (ALEVE) 220 MG tablet Take 440 mg by mouth 2 (Two) Times a Day As Needed.     • baclofen (LIORESAL) 10 MG tablet Take 1 tablet by mouth 2 (Two) Times a Day. 60 tablet 2   • oxyCODONE-acetaminophen (PERCOCET)  MG per tablet Take 1 tablet by mouth Every 4 (Four) Hours As Needed for Moderate Pain . 18 tablet 0   • [DISCONTINUED] methylPREDNISolone (MEDROL, SUAD,) 4 MG tablet follow package directions 1 each 0     No current facility-administered medications on file prior to encounter.        Past Medical History:   Diagnosis Date   • Gout    • Hypertension        SOCIAL HISTORY:  Counseled to avoid tobacco     REVIEW OF  "SYSTEMS:  No pertinent hematologic, infectious, or constitutional symptoms.  Other review of systems non-contributory     PHYSICAL EXAM:  Ht 182.9 cm (72\")   Wt 104 kg (230 lb)   BMI 31.19 kg/m²   Constitutional: Well-developed well-nourished no acute distress  General: Alert and oriented  Ophtho: EOMI  Airway: Mallampati 2  Cardiac:  Regular rate  Lungs:  Clear to auscultation bilaterally   GI: soft, nontender  Lumbar Spine: Noncontributory  Sacroiliac Joints: Noncontributory  Musc: Decreased range of motion and pain with flexion extension  Neuro: Spurling's test re-creates the pain in his left arm       DIAGNOSIS:  Post-Op Diagnosis Codes:  Post-Op Diagnosis Codes:     * Disorder of intervertebral disc of cervical spine [M50.90]     * Cervical radiculopathy [M54.12]     PLAN:  -  Cervical epidural steroid injections with a planned entry level of C6-7 and attempted medication spread at several levels. These will likely be spaced approximately 2-4 weeks apart.  If pain control is acceptable after this injection, it was discussed with the patient that they may return for the subsequent injections if and when their pain returns.     - Risks were discussed with the patient, including but not limited to: failure of relief, worsening pain, tissue, nerve, blood vessel or spinal cord damage, post-dural-puncture headache, bleeding, epidural hematoma, infection, and epidural abscess. Benefits and alternatives were also discussed. No promises were made. Risks/benefits/alternatives of procedural medications were also discussed, including but not limited to hyperglycemia, fluid retention, decreased immune system function, osteoporosis, and anaphylactoid/allergic reactions. The patient voiced understanding and agreed to proceed.  -  Physical therapy exercises at home should be considered, as tolerated, as prescribed by physical therapy or from the pain clinic handout (given to the patient).  Continuation of these exercises " every day, or multiple times per week, even when the patient has good pain relief, was stressed to the patient as a preventative measure to decrease the frequency and severity of future pain episodes.  -  It is recommended that the patient use the least amount of opioid medication possible.     -  Heat and ice to the affected area as tolerated for pain control.  It was discussed that heating pads can cause burns.  -  Daily low impact exercise such as walking or water exercise was recommended to maintain overall health and aid in weight control.   -  Patient was counseled to abstain from tobacco products.  -  Follow up as needed for subsequent injections.      Martinez Matias M.D., [facility], [facility]  Board Certified in Pain Medicine by the American Board of Anesthesiology  Board Certified in Anesthesiology by the American Board of Anesthesiology     Much of this encounter note is an electronic transcription/translation of spoken language to printed text. The electronic translation of spoken language may permit erroneous, or at times, nonsensical words or phrases to be inadvertently transcribed. Although I have reviewed the note for such errors, some may still exist.

## 2020-04-01 ENCOUNTER — OFFICE VISIT (OUTPATIENT)
Dept: NEUROSURGERY | Facility: CLINIC | Age: 55
End: 2020-04-01

## 2020-04-01 ENCOUNTER — TELEPHONE (OUTPATIENT)
Dept: NEUROSURGERY | Facility: CLINIC | Age: 55
End: 2020-04-01

## 2020-04-01 VITALS
TEMPERATURE: 97.9 F | DIASTOLIC BLOOD PRESSURE: 64 MMHG | BODY MASS INDEX: 31.19 KG/M2 | SYSTOLIC BLOOD PRESSURE: 128 MMHG | HEIGHT: 72 IN | HEART RATE: 68 BPM

## 2020-04-01 DIAGNOSIS — M50.122 CERVICAL DISC DISORDER AT C5-C6 LEVEL WITH RADICULOPATHY: Primary | ICD-10-CM

## 2020-04-01 DIAGNOSIS — M50.123 CERVICAL DISC DISORDER AT C6-C7 LEVEL WITH RADICULOPATHY: ICD-10-CM

## 2020-04-01 PROCEDURE — 99213 OFFICE O/P EST LOW 20 MIN: CPT | Performed by: NEUROLOGICAL SURGERY

## 2020-04-01 NOTE — TELEPHONE ENCOUNTER
Patient was given hand written script in office for cervical traction unit by Dr TIMOTHY Saldivar does not have pump up traction unit that Dr AGUIRRE had mentioned, discussed with Dr AGUIRRE and he said to get an over the door traction unit- patient informed and he will contact Yariel

## 2020-04-20 ENCOUNTER — TELEPHONE (OUTPATIENT)
Dept: NEUROSURGERY | Facility: CLINIC | Age: 55
End: 2020-04-20

## 2020-04-20 NOTE — TELEPHONE ENCOUNTER
LM for patient to see if he would like to move his appt with Dr. Bermudez from May 11 to April 23rd as a telephone visit. If patient calls back please add to April 23rd schedule as a telephone visit.

## 2020-05-01 NOTE — PROGRESS NOTES
Subjective   History of Present Illness: Orion Pop is a 55 y.o. male for televisit follow up after using home cervical traction unit that helps.  He is using the traction unit weekly and is following HEP      Patient was last seen 4.1.20 for neck and arm pain and weakness.  He was given order for home cervical traction unit at that time    He is now having intermittent neck pain that is mild, no arm pain or weakness, he has intermittent N/T left hand and he denies urinary incontinence or problems with his balance and gait.  He is not taking any pain meds or muscle relaxants    You have chosen to receive care through a telephone visit. Do you consent to use a telephone visit for your medical care today? Yes, he is at work    This was a televisit from my office.  The patient was at home.  It lasted 10 minutes.  He has known cervical disc disease at C5-C6 and C6-C7 with a left-sided radiculopathy.  He was a lot better last time I saw him after a block.  He continues to do pretty well with that and does not have any significant pain.  He is not taking any pain medications.  He has no weakness by his report.  It had resolved by the last time I saw him.  He still has some numbness and tingling in his thumb and index finger, which might take longer to get better.  We will check on him via televisit in 3 months.  I told him to do his exercises regularly including the home cervical traction unit, which he got.  He is aware of the risk of flare ups.      Neck Pain    The problem occurs intermittently. The problem has been gradually improving. The pain is present in the midline. The pain is at a severity of 3/10. The pain is mild. Associated symptoms include numbness and tingling. Pertinent negatives include no weakness.       The following portions of the patient's history were reviewed and updated as appropriate: allergies, current medications, past family history, past medical history, past social history, past surgical  history and problem list.    Review of Systems   Constitutional: Negative.    HENT: Negative.    Eyes: Negative.    Respiratory: Negative.    Cardiovascular: Negative.    Gastrointestinal: Negative.    Endocrine: Negative.    Genitourinary: Negative for urgency.   Musculoskeletal: Positive for neck pain. Negative for arthralgias, gait problem, myalgias and neck stiffness.   Allergic/Immunologic: Negative.    Neurological: Positive for tingling and numbness. Negative for weakness.   Hematological: Negative.    Psychiatric/Behavioral: Negative.        Objective             Physical Exam   Deferred  Neurologic Exam  Deferred      Assessment/Plan   Independent Review of Radiographic Studies:      I personally reviewed the images from the following studies.    I reviewed the cervical MRI done on 3/15/2020 which shows osteophytic cervical disc disease with left-sided root compression at C5-C6 and C6-C7.  There is even a little bit at C3-C4 with left-sided root compression.  Agree with the report.    Medical Decision Making:      Overall doing well.  We will do another tele-visit in about 3 months to see if the numbness has gotten better.  I told him to do his exercises and do his traction regularly.      Orion was seen today for neck pain and numbness.    Diagnoses and all orders for this visit:    Cervical disc disorder at C5-C6 level with radiculopathy    Cervical disc disorder at C6-C7 level with radiculopathy      Return in about 3 months (around 8/11/2020).

## 2020-05-11 ENCOUNTER — OFFICE VISIT (OUTPATIENT)
Dept: NEUROSURGERY | Facility: CLINIC | Age: 55
End: 2020-05-11

## 2020-05-11 DIAGNOSIS — M50.122 CERVICAL DISC DISORDER AT C5-C6 LEVEL WITH RADICULOPATHY: Primary | ICD-10-CM

## 2020-05-11 DIAGNOSIS — M50.123 CERVICAL DISC DISORDER AT C6-C7 LEVEL WITH RADICULOPATHY: ICD-10-CM

## 2020-05-11 PROCEDURE — 99213 OFFICE O/P EST LOW 20 MIN: CPT | Performed by: NEUROLOGICAL SURGERY

## 2020-08-06 NOTE — PROGRESS NOTES
Subjective   History of Present Illness: Orion Pop is a 55 y.o. male for televisit follow up.      Patient had televisit 5.11.20 for neck pain and N/T left hand    Patient states that he is currently having intermittent N/T left hand that has improved. He is following a HEP that seems to help, he denies neck or arm pain and no weakness, urinary incontinence or problems with his balance and gait    You have chosen to receive care through a telephone visit. Do you consent to use a telephone visit for your medical care today? Yes  This was a tele-visit from my office.  It lasted 5 minutes.  He was at home.  This gentleman has known disc problems at C5-6 and C6-7 that had been causing neck and left arm pain.  These have resolved and he is doing quite well.  He still has a little bit of residual numbness and tingling but he can tolerate that.  He is doing his exercises and his home traction.  We can keep it open-ended since he is doing better.  He is aware of the risk of recurrence and if that happens he can let us know.            History of Present Illness        Review of Systems   Constitutional: Negative.    HENT: Negative.    Eyes: Negative.    Respiratory: Negative.    Cardiovascular: Negative.    Gastrointestinal: Negative.    Endocrine: Negative.    Genitourinary: Negative for urgency.   Musculoskeletal: Negative for arthralgias, gait problem, joint swelling, myalgias, neck pain and neck stiffness.   Allergic/Immunologic: Negative.    Neurological: Positive for numbness. Negative for weakness.   Hematological: Negative.    Psychiatric/Behavioral: Negative.        Objective             Physical Exam   Deferred  Neurologic Exam   Deferred        Assessment/Plan   Independent Review of Radiographic Studies:      I personally reviewed the images from the following studies.    I reviewed the cervical MRI done on 3/15/2020 which shows osteophytic cervical disc disease with left-sided root compression at C5-C6 and  C6-C7.  There is even a little bit at C3-C4 with left-sided root compression.  Agree with the report.    Medical Decision Making:      We can keep it open-ended.  He is aware of the risk of recurrence and if that happens he can certainly come back to see us again.      Orion was seen today for numbness.    Diagnoses and all orders for this visit:    Cervical disc disorder at C5-C6 level with radiculopathy    Cervical disc disorder at C6-C7 level with radiculopathy      Return if symptoms worsen or fail to improve.

## 2020-08-12 ENCOUNTER — OFFICE VISIT (OUTPATIENT)
Dept: NEUROSURGERY | Facility: CLINIC | Age: 55
End: 2020-08-12

## 2020-08-12 DIAGNOSIS — M50.123 CERVICAL DISC DISORDER AT C6-C7 LEVEL WITH RADICULOPATHY: ICD-10-CM

## 2020-08-12 DIAGNOSIS — M50.122 CERVICAL DISC DISORDER AT C5-C6 LEVEL WITH RADICULOPATHY: Primary | ICD-10-CM

## 2020-08-12 PROCEDURE — 99213 OFFICE O/P EST LOW 20 MIN: CPT | Performed by: NEUROLOGICAL SURGERY

## 2021-03-26 ENCOUNTER — BULK ORDERING (OUTPATIENT)
Dept: CASE MANAGEMENT | Facility: OTHER | Age: 56
End: 2021-03-26

## 2021-03-26 DIAGNOSIS — Z23 IMMUNIZATION DUE: ICD-10-CM

## 2021-07-13 ENCOUNTER — OFFICE VISIT (OUTPATIENT)
Dept: FAMILY MEDICINE CLINIC | Facility: CLINIC | Age: 56
End: 2021-07-13

## 2021-07-13 VITALS
DIASTOLIC BLOOD PRESSURE: 90 MMHG | OXYGEN SATURATION: 98 % | SYSTOLIC BLOOD PRESSURE: 144 MMHG | HEART RATE: 84 BPM | BODY MASS INDEX: 30.07 KG/M2 | HEIGHT: 72 IN | WEIGHT: 222 LBS | TEMPERATURE: 98.8 F

## 2021-07-13 DIAGNOSIS — E78.5 HYPERLIPIDEMIA, UNSPECIFIED HYPERLIPIDEMIA TYPE: ICD-10-CM

## 2021-07-13 DIAGNOSIS — M1A.0790 IDIOPATHIC CHRONIC GOUT OF FOOT WITHOUT TOPHUS, UNSPECIFIED LATERALITY: Primary | ICD-10-CM

## 2021-07-13 DIAGNOSIS — Z11.59 ENCOUNTER FOR HEPATITIS C SCREENING TEST FOR LOW RISK PATIENT: ICD-10-CM

## 2021-07-13 DIAGNOSIS — Z12.5 SPECIAL SCREENING, PROSTATE CANCER: ICD-10-CM

## 2021-07-13 DIAGNOSIS — L98.9 SKIN LESION OF FACE: ICD-10-CM

## 2021-07-13 DIAGNOSIS — Z12.11 SCREENING FOR COLON CANCER: ICD-10-CM

## 2021-07-13 DIAGNOSIS — I10 ESSENTIAL HYPERTENSION: ICD-10-CM

## 2021-07-13 PROCEDURE — 99204 OFFICE O/P NEW MOD 45 MIN: CPT | Performed by: INTERNAL MEDICINE

## 2021-07-13 NOTE — PROGRESS NOTES
Subjective   Orion Pop is a 56 y.o. male.     Chief Complaint   Patient presents with   • Gout       History of Present Illness   Patient is a new patient to our office who is a 56-year-old white male with history of gout diagnosed 10 years ago.  Was seen in the urgent care center on 7/2/2021 with an acute gout attack involving the left foot/first toe.  Was given injection of Toradol 3 mg IM and dexamethasone 10 mg IM in office and prescribed colchicine 0.6 mg and prednisone.  In the last 10 years only 4-5 episodes till the last 2 months has had several episodes.    History of slightly high BP and cholesterol but never fully diagnosed as hypertensive or high cholesterol.  Does have history of CAD.  The following portions of the patient's history were reviewed and updated as appropriate: allergies, current medications, past family history, past medical history, past social history, past surgical history and problem list.    Depression Screen:  No flowsheet data found.    Past Medical History:   Diagnosis Date   • Gout    • Hyperlipidemia    • Hypertension    • Neck pain        Past Surgical History:   Procedure Laterality Date   • APPENDECTOMY     • VASECTOMY         Family History   Problem Relation Age of Onset   • Diabetes Mother    • Heart disease Mother 62   • Diabetes Father    • Heart disease Father 72       Social History     Socioeconomic History   • Marital status:      Spouse name: Not on file   • Number of children: Not on file   • Years of education: Not on file   • Highest education level: Not on file   Tobacco Use   • Smoking status: Never Smoker   • Smokeless tobacco: Never Used   Vaping Use   • Vaping Use: Never used   Substance and Sexual Activity   • Alcohol use: Yes     Alcohol/week: 4.0 standard drinks     Types: 2 Cans of beer, 2 Shots of liquor per week     Comment: occassionally   • Drug use: No   • Sexual activity: Yes     Partners: Female       Current Outpatient Medications  "  Medication Sig Dispense Refill   • Loratadine (CLARITIN PO) Take  by mouth.       No current facility-administered medications for this visit.       Review of Systems    Objective   /90 (BP Location: Left arm, Patient Position: Sitting, Cuff Size: Adult)   Pulse 84   Temp 98.8 °F (37.1 °C) (Temporal)   Ht 182.9 cm (72\")   Wt 101 kg (222 lb)   SpO2 98%   BMI 30.11 kg/m²     Physical Exam  Vitals and nursing note reviewed.   Constitutional:       General: He is not in acute distress.     Appearance: He is well-developed. He is not diaphoretic.   HENT:      Head: Normocephalic and atraumatic.      Right Ear: External ear normal.      Left Ear: External ear normal.      Nose: Nose normal.   Eyes:      Conjunctiva/sclera: Conjunctivae normal.      Pupils: Pupils are equal, round, and reactive to light.   Neck:      Thyroid: No thyromegaly.      Trachea: No tracheal deviation.   Cardiovascular:      Rate and Rhythm: Normal rate and regular rhythm.      Heart sounds: Normal heart sounds. No murmur heard.   No friction rub. No gallop.    Pulmonary:      Effort: Pulmonary effort is normal. No respiratory distress.      Breath sounds: Normal breath sounds.   Abdominal:      General: Bowel sounds are normal.      Palpations: Abdomen is soft. There is no mass.      Tenderness: There is no abdominal tenderness. There is no guarding.   Musculoskeletal:         General: Normal range of motion.      Cervical back: Normal range of motion and neck supple.      Comments: Left first toe base with enlarged bone/bunion and left heel with posterior bony prominence.   Lymphadenopathy:      Cervical: No cervical adenopathy.   Skin:     General: Skin is warm and dry.      Capillary Refill: Capillary refill takes less than 2 seconds.      Findings: No rash.      Comments: Left forehead hyperpigmented irregular bordered skin lesion. And several AK of the forehead and scalp.   Neurological:      Mental Status: He is alert and " oriented to person, place, and time.      Motor: No abnormal muscle tone.      Deep Tendon Reflexes: Reflexes normal.   Psychiatric:         Behavior: Behavior normal.         Thought Content: Thought content normal.         Judgment: Judgment normal.         No results found for this or any previous visit (from the past 2016 hour(s)).  Assessment/Plan   Diagnoses and all orders for this visit:    1. Idiopathic chronic gout of foot without tophus, unspecified laterality (Primary)    2. Essential hypertension    3. Hyperlipidemia, unspecified hyperlipidemia type    4. Special screening, prostate cancer    5. Screening for colon cancer    6. Encounter for hepatitis C screening test for low risk patient    New patient with no primary doctor for the last 15 years.  Has obvious gouty flare with some possible bony changes.  I discussed with patient gout causes, exacerbate her's, and treatment options.  He is agreeable to trying to consider utilizing preventive medicines.  We will recheck the labs today with a uric acid and kidney function if everything is expected with an elevated uric acid level and a normal kidney function then we will consider starting him on the colchicine daily for a month while initiating allopurinol treatment at 300 mg daily.  Patient is understanding about the risk of having a flare of the gout during the initial phases of the allopurinol.  Also informed that does go through the kidneys and we will be monitoring this as well.  Patient with some history of hypertension though this is really his first high blood pressure that we have been able to read.  He will follow back up in the office in approximately 1 to 2 months and if his blood pressure is still elevated at that time then we will consider initiating a new medication for blood pressure control.  History of hyperlipidemia with no labs available and is on no medications will check the lipid panel and will determine if we will need to start  medications with the understanding of his family history of coronary artery disease.  Upon further review patient is due to have hepatitis C screening and he needs prostate screening for cancer screening and colon cancer screening.  We discussed the options for colon cancer screening including colonoscopy, Cologuard, sigmoidoscopy, Hemoccult testing.  Patient is willing to try the Cologuard testing and an order has been entered and information brochure was given to patient.           · COVID-19 Precautions - Patient was compliant in wearing a mask. When I saw the patient, I used appropriate personal protective equipment (PPE) including mask and eye shield (standard procedure).  Additionally, I used gown and gloves if indicated.  Hand hygiene was completed before and after seeing the patient.  · Dictated utilizing Dragon Dictation

## 2021-07-13 NOTE — PATIENT INSTRUCTIONS
Gout    Gout is painful swelling of your joints. Gout is a type of arthritis. It is caused by having too much uric acid in your body. Uric acid is a chemical that is made when your body breaks down substances called purines. If your body has too much uric acid, sharp crystals can form and build up in your joints. This causes pain and swelling.  Gout attacks can happen quickly and be very painful (acute gout). Over time, the attacks can affect more joints and happen more often (chronic gout).  What are the causes?  · Too much uric acid in your blood. This can happen because:  ? Your kidneys do not remove enough uric acid from your blood.  ? Your body makes too much uric acid.  ? You eat too many foods that are high in purines. These foods include organ meats, some seafood, and beer.  · Trauma or stress.  What increases the risk?  · Having a family history of gout.  · Being male and middle-aged.  · Being female and having gone through menopause.  · Being very overweight (obese).  · Drinking alcohol, especially beer.  · Not having enough water in the body (being dehydrated).  · Losing weight too quickly.  · Having an organ transplant.  · Having lead poisoning.  · Taking certain medicines.  · Having kidney disease.  · Having a skin condition called psoriasis.  What are the signs or symptoms?  An attack of acute gout usually happens in just one joint. The most common place is the big toe. Attacks often start at night. Other joints that may be affected include joints of the feet, ankle, knee, fingers, wrist, or elbow. Symptoms of an attack may include:  · Very bad pain.  · Warmth.  · Swelling.  · Stiffness.  · Shiny, red, or purple skin.  · Tenderness. The affected joint may be very painful to touch.  · Chills and fever.  Chronic gout may cause symptoms more often. More joints may be involved. You may also have white or yellow lumps (tophi) on your hands or feet or in other areas near your joints.  How is this  treated?  · Treatment for this condition has two phases: treating an acute attack and preventing future attacks.  · Acute gout treatment may include:  ? NSAIDs.  ? Steroids. These are taken by mouth or injected into a joint.  ? Colchicine. This medicine relieves pain and swelling. It can be given by mouth or through an IV tube.  · Preventive treatment may include:  ? Taking small doses of NSAIDs or colchicine daily.  ? Using a medicine that reduces uric acid levels in your blood.  ? Making changes to your diet. You may need to see a food expert (dietitian) about what to eat and drink to prevent gout.  Follow these instructions at home:  During a gout attack    · If told, put ice on the painful area:  ? Put ice in a plastic bag.  ? Place a towel between your skin and the bag.  ? Leave the ice on for 20 minutes, 2-3 times a day.  · Raise (elevate) the painful joint above the level of your heart as often as you can.  · Rest the joint as much as possible. If the joint is in your leg, you may be given crutches.  · Follow instructions from your doctor about what you cannot eat or drink.  Avoiding future gout attacks  · Eat a low-purine diet. Avoid foods and drinks such as:  ? Liver.  ? Kidney.  ? Anchovies.  ? Asparagus.  ? Herring.  ? Mushrooms.  ? Mussels.  ? Beer.  · Stay at a healthy weight. If you want to lose weight, talk with your doctor. Do not lose weight too fast.  · Start or continue an exercise plan as told by your doctor.  Eating and drinking  · Drink enough fluids to keep your pee (urine) pale yellow.  · If you drink alcohol:  ? Limit how much you use to:  § 0-1 drink a day for women.  § 0-2 drinks a day for men.  ? Be aware of how much alcohol is in your drink. In the [place] one drink equals one 12 oz bottle of beer (355 mL), one 5 oz glass of wine (148 mL), or one 1½ oz glass of hard liquor (44 mL).  General instructions  · Take over-the-counter and prescription medicines only as told by your doctor.  · Do  not drive or use heavy machinery while taking prescription pain medicine.  · Return to your normal activities as told by your doctor. Ask your doctor what activities are safe for you.  · Keep all follow-up visits as told by your doctor. This is important.  Contact a doctor if:  · You have another gout attack.  · You still have symptoms of a gout attack after 10 days of treatment.  · You have problems (side effects) because of your medicines.  · You have chills or a fever.  · You have burning pain when you pee (urinate).  · You have pain in your lower back or belly.  Get help right away if:  · You have very bad pain.  · Your pain cannot be controlled.  · You cannot pee.  Summary  · Gout is painful swelling of the joints.  · The most common site of pain is the big toe, but it can affect other joints.  · Medicines and avoiding some foods can help to prevent and treat gout attacks.  This information is not intended to replace advice given to you by your health care provider. Make sure you discuss any questions you have with your health care provider.  Document Revised: 07/10/2019 Document Reviewed: 07/10/2019  Elsevier Patient Education © 2021 [employer]

## 2021-07-14 LAB
ALBUMIN SERPL-MCNC: 4.4 G/DL (ref 3.8–4.9)
ALBUMIN/GLOB SERPL: 1.5 {RATIO} (ref 1.2–2.2)
ALP SERPL-CCNC: 65 IU/L (ref 48–121)
ALT SERPL-CCNC: 34 IU/L (ref 0–44)
AST SERPL-CCNC: 23 IU/L (ref 0–40)
BILIRUB SERPL-MCNC: 0.3 MG/DL (ref 0–1.2)
BUN SERPL-MCNC: 15 MG/DL (ref 6–24)
BUN/CREAT SERPL: 15 (ref 9–20)
CALCIUM SERPL-MCNC: 10.2 MG/DL (ref 8.7–10.2)
CHLORIDE SERPL-SCNC: 101 MMOL/L (ref 96–106)
CHOLEST SERPL-MCNC: 263 MG/DL (ref 100–199)
CO2 SERPL-SCNC: 22 MMOL/L (ref 20–29)
CREAT SERPL-MCNC: 0.99 MG/DL (ref 0.76–1.27)
GLOBULIN SER CALC-MCNC: 2.9 G/DL (ref 1.5–4.5)
GLUCOSE SERPL-MCNC: 121 MG/DL (ref 65–99)
HCV AB S/CO SERPL IA: <0.1 S/CO RATIO (ref 0–0.9)
HDLC SERPL-MCNC: 41 MG/DL
LDLC SERPL CALC-MCNC: 179 MG/DL (ref 0–99)
POTASSIUM SERPL-SCNC: 5.7 MMOL/L (ref 3.5–5.2)
PROT SERPL-MCNC: 7.3 G/DL (ref 6–8.5)
PSA SERPL-MCNC: 0.5 NG/ML (ref 0–4)
SODIUM SERPL-SCNC: 141 MMOL/L (ref 134–144)
TRIGL SERPL-MCNC: 225 MG/DL (ref 0–149)
URATE SERPL-MCNC: 8.6 MG/DL (ref 3.8–8.4)
VLDLC SERPL CALC-MCNC: 43 MG/DL (ref 5–40)

## 2021-07-15 ENCOUNTER — TELEPHONE (OUTPATIENT)
Dept: FAMILY MEDICINE CLINIC | Facility: CLINIC | Age: 56
End: 2021-07-15

## 2021-07-15 DIAGNOSIS — M1A.0790 IDIOPATHIC CHRONIC GOUT OF FOOT WITHOUT TOPHUS, UNSPECIFIED LATERALITY: ICD-10-CM

## 2021-07-15 DIAGNOSIS — E78.5 HYPERLIPIDEMIA, UNSPECIFIED HYPERLIPIDEMIA TYPE: Primary | ICD-10-CM

## 2021-07-15 DIAGNOSIS — E87.5 HYPERKALEMIA: ICD-10-CM

## 2021-07-15 DIAGNOSIS — L98.9 SKIN LESION: Primary | ICD-10-CM

## 2021-07-15 RX ORDER — ALLOPURINOL 300 MG/1
300 TABLET ORAL DAILY
Qty: 90 TABLET | Refills: 1 | Status: SHIPPED | OUTPATIENT
Start: 2021-07-15 | End: 2021-10-20

## 2021-07-15 RX ORDER — ATORVASTATIN CALCIUM 10 MG/1
10 TABLET, FILM COATED ORAL DAILY
Qty: 90 TABLET | Refills: 1 | Status: SHIPPED | OUTPATIENT
Start: 2021-07-15 | End: 2022-01-13

## 2021-07-15 RX ORDER — COLCHICINE 0.6 MG/1
0.6 TABLET ORAL DAILY
Qty: 30 TABLET | Refills: 0 | Status: SHIPPED | OUTPATIENT
Start: 2021-07-15 | End: 2021-08-17

## 2021-07-15 NOTE — TELEPHONE ENCOUNTER
PATIENT STATED:     DO YOU HAVE A RECOMMENDATION FOR DERMATOLOGIST THAT IS IN THE [facility] NETWORK?     IF SO, CAN HE HAVE A REFERRAL         HE ALSO STATED THAT THE  WAS GOING TO CALL IN SOMETHING FOR GOUT - AFTER RESULTS WERE READ       PLEASE ADVISE     Orion Pop (Self) [phone] (M)         PHARMACY:   [facility]/pharmacy #[id number] - [place], [place] - [address] - [phone]  - [phone]   [phone]

## 2021-07-16 ENCOUNTER — TELEPHONE (OUTPATIENT)
Dept: FAMILY MEDICINE CLINIC | Facility: CLINIC | Age: 56
End: 2021-07-16

## 2021-07-16 NOTE — TELEPHONE ENCOUNTER
OK FOR HUB TO READ    LM[facility]      Your blood test indicate your uric acid is very high which is consistent with a gout.  As we discussed in the office I recommend starting therapy with colchicine 1 pill daily for 1 month and initiating allopurinol 300 mg daily chronically.  Start with both medications at the same time for the first month and after 1 month stop the colchicine and continue the allopurinol.  We will recheck your level for the uric acid in 3 to 6 months.  The potassium level was slightly elevated.  Please be sure that you are not taking any potassium supplements or anything that has potassium in it.  I would recommend rechecking this level in 2 to 3 weeks.  An order has been entered for this so that you do not have to see me.  Please schedule an appointment for your lab only.  The cholesterol also remains elevated.  I believe we discussed this as well and the current recommendations are to initiate medication at this level with something such as atorvastatin 10 mg daily.  Prescription has been sent in for the pharmacy for this as well.  Please continue to follow your low-fat low-cholesterol diet and exercise the best that you can.

## 2021-08-17 ENCOUNTER — OFFICE VISIT (OUTPATIENT)
Dept: FAMILY MEDICINE CLINIC | Facility: CLINIC | Age: 56
End: 2021-08-17

## 2021-08-17 VITALS
SYSTOLIC BLOOD PRESSURE: 118 MMHG | BODY MASS INDEX: 30.61 KG/M2 | TEMPERATURE: 97.9 F | HEART RATE: 68 BPM | OXYGEN SATURATION: 98 % | HEIGHT: 72 IN | WEIGHT: 226 LBS | DIASTOLIC BLOOD PRESSURE: 68 MMHG

## 2021-08-17 DIAGNOSIS — E78.5 HYPERLIPIDEMIA, UNSPECIFIED HYPERLIPIDEMIA TYPE: ICD-10-CM

## 2021-08-17 DIAGNOSIS — E87.6 HYPOKALEMIA: ICD-10-CM

## 2021-08-17 DIAGNOSIS — Z00.00 ANNUAL PHYSICAL EXAM: Primary | ICD-10-CM

## 2021-08-17 DIAGNOSIS — M1A.0790 IDIOPATHIC CHRONIC GOUT OF FOOT WITHOUT TOPHUS, UNSPECIFIED LATERALITY: ICD-10-CM

## 2021-08-17 PROCEDURE — 99396 PREV VISIT EST AGE 40-64: CPT | Performed by: INTERNAL MEDICINE

## 2021-08-17 NOTE — PROGRESS NOTES
Chief Complaint   Patient presents with   • Annual Exam       HPI:  Orion Pop, -1965, is a 56 y.o. male who presents for an annual physical.    Follow-up for cholesterol.  Currently, has been feeling well without any myalgias, muscle aches, weakness, numbness, chest pain, short of breath or other issues.  Currently, is adherent with medication regimen of atorvastatin 10 mg daily and denies medication side effects. Is due for lab follow-up.    Follow-up for hypertension.  Currently, has been feeling well and asymptomatic without any headaches, vision changes, cough, chest pain, shortness of breath, swelling, focal neurologic deficit, memory loss or syncope.  Currently, is on no medication.  Denies medication side effects and no significant interval events.      Patient had labs on 2021 and noted to have an elevated potassium level of 5.7.    Last visit on 2021 patient was noted to have gout and was started on 1 month of colchicine while starting allopurinol.  He was also noted to have an elevated blood pressure at that time was asked to follow-up today to recheck this blood pressure.    Recent Hospitalizations:  No hospitalization(s) within the last year..    Current Medical Providers:  Patient Care Team:  Jules Jean Baptiste MD as PCP - General (Internal Medicine)  Lennox Bermudez MD as Surgeon (Neurosurgery)    Compared to one year ago, the patient feels his physical health is better and his mental health is the same.    Depression Screen:  No flowsheet data found.    Past Medical/Family/Social History:  The following portions of the patient's history were reviewed and updated as appropriate: allergies, current medications, past family history, past medical history, past social history, past surgical history and problem list.    No Known Allergies      Current Outpatient Medications:   •  allopurinol (Zyloprim) 300 MG tablet, Take 1 tablet by mouth Daily., Disp: 90 tablet, Rfl: 1  •   atorvastatin (LIPITOR) 10 MG tablet, Take 1 tablet by mouth Daily., Disp: 90 tablet, Rfl: 1  •  Loratadine (CLARITIN PO), Take  by mouth., Disp: , Rfl:     Current medication list contains no high risk medications.  No harmful drug interactions have been identified.     Family History   Problem Relation Age of Onset   • Diabetes Mother    • Heart disease Mother 62   • Diabetes Father    • Heart disease Father 72       Social History     Tobacco Use   • Smoking status: Never Smoker   • Smokeless tobacco: Never Used   Substance Use Topics   • Alcohol use: Yes     Alcohol/week: 4.0 standard drinks     Types: 2 Cans of beer, 2 Shots of liquor per week     Comment: occassionally       Past Surgical History:   Procedure Laterality Date   • APPENDECTOMY     • VASECTOMY         Patient Active Problem List   Diagnosis   • Cervical disc disorder at C5-C6 level with radiculopathy   • Gout   • Hyperlipidemia   • Special screening, prostate cancer   • Annual physical exam       Review of Systems   Constitutional: Negative for activity change, appetite change, fatigue, fever, unexpected weight gain and unexpected weight loss.   HENT: Negative for nosebleeds, rhinorrhea, trouble swallowing and voice change.    Eyes: Negative for visual disturbance.   Respiratory: Negative for cough, chest tightness, shortness of breath and wheezing.    Cardiovascular: Negative for chest pain, palpitations and leg swelling.   Gastrointestinal: Negative for abdominal pain, blood in stool, constipation, diarrhea, nausea, vomiting, GERD and indigestion.   Genitourinary: Negative for dysuria, frequency and hematuria.   Musculoskeletal: Negative for arthralgias, back pain and myalgias.   Skin: Negative for rash and bruise.   Neurological: Negative for dizziness, tremors, weakness, light-headedness, numbness, headache and memory problem.   Hematological: Negative for adenopathy. Does not bruise/bleed easily.   Psychiatric/Behavioral: Negative for sleep  "disturbance and depressed mood. The patient is not nervous/anxious.        Objective     Vitals:    08/17/21 0840   BP: 118/68   BP Location: Left arm   Patient Position: Sitting   Cuff Size: Adult   Pulse: 68   Temp: 97.9 °F (36.6 °C)   TempSrc: Temporal   SpO2: 98%   Weight: 103 kg (226 lb)   Height: 182.9 cm (72.01\")       Patient's Body mass index is 30.64 kg/m². indicating that he is obese (BMI >30). Obesity-related health conditions include the following: dyslipidemias. Obesity is unchanged. BMI is is above average; BMI management plan is completed. We discussed portion control and increasing exercise..      No exam data present    Physical Exam  Vitals and nursing note reviewed.   Constitutional:       General: He is not in acute distress.     Appearance: He is well-developed. He is not diaphoretic.   HENT:      Head: Normocephalic and atraumatic.      Right Ear: External ear normal.      Left Ear: External ear normal.      Nose: Nose normal.   Eyes:      Conjunctiva/sclera: Conjunctivae normal.      Pupils: Pupils are equal, round, and reactive to light.   Neck:      Thyroid: No thyromegaly.      Trachea: No tracheal deviation.   Cardiovascular:      Rate and Rhythm: Normal rate and regular rhythm.      Heart sounds: Normal heart sounds. No murmur heard.   No friction rub. No gallop.    Pulmonary:      Effort: Pulmonary effort is normal. No respiratory distress.      Breath sounds: Normal breath sounds.   Abdominal:      General: Bowel sounds are normal.      Palpations: Abdomen is soft. There is no mass.      Tenderness: There is no abdominal tenderness. There is no guarding.   Musculoskeletal:         General: Normal range of motion.      Cervical back: Normal range of motion and neck supple.      Comments: Left first toe base with enlarged bone/bunion and left heel with posterior bony prominence.    Lymphadenopathy:      Cervical: No cervical adenopathy.   Skin:     General: Skin is warm and dry.      " Capillary Refill: Capillary refill takes less than 2 seconds.   Neurological:      Mental Status: He is alert and oriented to person, place, and time.      Motor: No abnormal muscle tone.      Deep Tendon Reflexes: Reflexes normal.   Psychiatric:         Behavior: Behavior normal.         Thought Content: Thought content normal.         Judgment: Judgment normal.         Recent Lab Results:  Lab Results   Component Value Date     (H) 08/17/2021     Lab Results   Component Value Date    TRIG 225 (H) 07/13/2021    HDL 41 07/13/2021    VLDL 43 (H) 07/13/2021       Assessment/Plan   Age-appropriate Screening Schedule:  Refer to the list below for future screening recommendations based on patient's age, sex and/or medical conditions.      Health Maintenance   Topic Date Due   • TDAP/TD VACCINES (1 - Tdap) Never done   • ZOSTER VACCINE (1 of 2) Never done   • INFLUENZA VACCINE  10/01/2021   • LIPID PANEL  07/13/2022       Diagnoses and all orders for this visit:    1. Annual physical exam (Primary)    2. Hypokalemia  -     Basic Metabolic Panel    3. Hyperlipidemia, unspecified hyperlipidemia type    4. 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  All past history, medications, social history, and problem list were reviewed.  Discussed advanced directives and living will.  Patient has living will: Living will: Patient refused.  He will be discussing with the family member who has done this many times over.  Labs from 7/14/21 reviewed.  Discussed flu shot recommended to get the influenza vaccine annually in the fall.  Shingrix and pneumonia vaccination series discussed.  Encouraged follow-up with the eye doctor on annual basis.  Discussed weight and encouraged exercise as tolerated while following a healthy diet.  Reviewed sexual health and safe sex practices.  Colon cancer screening discussed and current status: jt completed and  awaiting results.  PSA done 7/14/21 and normal.    An After Visit Summary with all of these plans were given to the patient.        Follow Up:  Return in about 6 months (around 2/17/2022).

## 2021-08-18 LAB
BUN SERPL-MCNC: 14 MG/DL (ref 6–20)
BUN/CREAT SERPL: 16.3 (ref 7–25)
CALCIUM SERPL-MCNC: 9.8 MG/DL (ref 8.6–10.5)
CHLORIDE SERPL-SCNC: 104 MMOL/L (ref 98–107)
CO2 SERPL-SCNC: 25.9 MMOL/L (ref 22–29)
CREAT SERPL-MCNC: 0.86 MG/DL (ref 0.76–1.27)
GLUCOSE SERPL-MCNC: 109 MG/DL (ref 65–99)
POTASSIUM SERPL-SCNC: 5.4 MMOL/L (ref 3.5–5.2)
SODIUM SERPL-SCNC: 140 MMOL/L (ref 136–145)

## 2021-09-15 ENCOUNTER — TELEPHONE (OUTPATIENT)
Dept: FAMILY MEDICINE CLINIC | Facility: CLINIC | Age: 56
End: 2021-09-15

## 2021-09-15 NOTE — TELEPHONE ENCOUNTER
Caller: Orion Pop    Relationship: Self    Best call back number: [phone]     What medications are you currently taking:   Current Outpatient Medications on File Prior to Visit   Medication Sig Dispense Refill   • allopurinol (Zyloprim) 300 MG tablet Take 1 tablet by mouth Daily. 90 tablet 1   • atorvastatin (LIPITOR) 10 MG tablet Take 1 tablet by mouth Daily. 90 tablet 1   • Loratadine (CLARITIN PO) Take  by mouth.       No current facility-administered medications on file prior to visit.          When did you start taking these medications: ABOUT MID JULY    Which medication are you concerned about: ALLOPURINOL    Who prescribed you this medication: DOCTOR FUNMI    What are your concerns: PATIENT STATES THAT HIS HANDS ARE PEELING AND RASH ON THEM AND ON BOTH LEGS AND FEET AS WELL AND HE WAS READING ABOUT SIDE EFFECTS OF THE ALLOPURINOL AND IS WONDERING WHAT HE SHOULD DO.

## 2021-09-15 NOTE — TELEPHONE ENCOUNTER
Yes please stop the allopurinol immediately.  We will see if this improves.  If his symptoms do not improve then we will need to see them if he worsens we need to see the rash much earlier.

## 2021-09-16 NOTE — TELEPHONE ENCOUNTER
Informed patient. He states he went to dermatology yesterday and they gave him steroids but he will discontinue the allopurinol

## 2021-10-18 ENCOUNTER — TELEPHONE (OUTPATIENT)
Dept: NEUROSURGERY | Facility: CLINIC | Age: 56
End: 2021-10-18

## 2021-10-18 NOTE — TELEPHONE ENCOUNTER
All past visits for patient were in 2020 with the most recent being 8/12/2020. Patient completed cervical MRI on 3/15/2020. Patient had one cervical epidural on 3/23/20 by Dr. Kan. Patient has own traction device and is taking advil for pain.

## 2021-10-18 NOTE — TELEPHONE ENCOUNTER
"Provider: JULIO  Caller: ITZEL SEQUEIRA  Relationship to Patient: SELF  Phone Number: [phone]  Reason for Call: RETURNING SYMPTOMS  When was the patient last seen: 08/12/20  When did it start: MORNING OF 10/18/21 (WHILE DRIVING)  Where is it located: NECK, LEFT ARM  Characteristics of symptom/severity: 6-10/10  Timing- Is it constant or intermittent: CONSTANT  What makes it worse: SITTING IN CAR  What makes it better: NOTHING  What therapies/medications have you tried: EXERCISES FROM DR KIM, TIEN, WILL TRY TRACTION DEVICE    PATIENT WAS PREV SEEN FOR CERVICAL DISC DISORDER AND IS EXPERIENCING SYMPTOMS \"RETURNING WITH A VENGEANCE.\" PLEASE ADVISE ON SCHEDULING AND/OR RELIEF OPTIONS. THANK YOU.  "

## 2021-10-19 NOTE — TELEPHONE ENCOUNTER
Patient called back because he has not heard anything from our office regarding his symptoms and it had been 24 hours. He said he has now lost  in left hand the pain started yesterday the lost of  started today. I explained to him that we do not have any urgent emergent appointments for him to be seen. He has not been seen in the office sine 8/2020 most recent MRI was March 2020. I advised him to go to the emergency room to be evaluated for his currently symptoms. He is on our office wait list to be scheduled.

## 2021-10-20 ENCOUNTER — HOSPITAL ENCOUNTER (OUTPATIENT)
Facility: HOSPITAL | Age: 56
Discharge: HOME | End: 2021-10-22
Attending: EMERGENCY MEDICINE | Admitting: HOSPITALIST

## 2021-10-20 ENCOUNTER — APPOINTMENT (OUTPATIENT)
Dept: MRI IMAGING | Facility: HOSPITAL | Age: 56
End: 2021-10-20

## 2021-10-20 DIAGNOSIS — R29.898 WEAKNESS OF LEFT ARM: Primary | ICD-10-CM

## 2021-10-20 DIAGNOSIS — M50.20 HNP (HERNIATED NUCLEUS PULPOSUS), CERVICAL: ICD-10-CM

## 2021-10-20 DIAGNOSIS — R29.898 WEAKNESS OF LEFT UPPER EXTREMITY: ICD-10-CM

## 2021-10-20 DIAGNOSIS — M50.122 CERVICAL DISC DISORDER AT C5-C6 LEVEL WITH RADICULOPATHY: ICD-10-CM

## 2021-10-20 DIAGNOSIS — M50.10 HERNIATION OF CERVICAL INTERVERTEBRAL DISC WITH RADICULOPATHY: ICD-10-CM

## 2021-10-20 PROBLEM — M54.12 CERVICAL RADICULOPATHY: Status: ACTIVE | Noted: 2021-10-20

## 2021-10-20 LAB
ALBUMIN SERPL-MCNC: 4.7 G/DL (ref 3.5–5.2)
ALBUMIN/GLOB SERPL: 2 G/DL
ALP SERPL-CCNC: 59 U/L (ref 39–117)
ALT SERPL W P-5'-P-CCNC: 28 U/L (ref 1–41)
ANION GAP SERPL CALCULATED.3IONS-SCNC: 7.7 MMOL/L (ref 5–15)
APTT PPP: 28.2 SECONDS (ref 22.7–35.4)
AST SERPL-CCNC: 20 U/L (ref 1–40)
BASOPHILS # BLD AUTO: 0.03 10*3/MM3 (ref 0–0.2)
BASOPHILS NFR BLD AUTO: 0.6 % (ref 0–1.5)
BILIRUB SERPL-MCNC: 0.7 MG/DL (ref 0–1.2)
BUN SERPL-MCNC: 14 MG/DL (ref 6–20)
BUN/CREAT SERPL: 18.4 (ref 7–25)
CALCIUM SPEC-SCNC: 9.3 MG/DL (ref 8.6–10.5)
CHLORIDE SERPL-SCNC: 103 MMOL/L (ref 98–107)
CO2 SERPL-SCNC: 28.3 MMOL/L (ref 22–29)
CREAT SERPL-MCNC: 0.76 MG/DL (ref 0.76–1.27)
DEPRECATED RDW RBC AUTO: 46.4 FL (ref 37–54)
EOSINOPHIL # BLD AUTO: 0.05 10*3/MM3 (ref 0–0.4)
EOSINOPHIL NFR BLD AUTO: 1 % (ref 0.3–6.2)
ERYTHROCYTE [DISTWIDTH] IN BLOOD BY AUTOMATED COUNT: 13.3 % (ref 12.3–15.4)
GFR SERPL CREATININE-BSD FRML MDRD: 105 ML/MIN/1.73
GLOBULIN UR ELPH-MCNC: 2.4 GM/DL
GLUCOSE SERPL-MCNC: 109 MG/DL (ref 65–99)
HCT VFR BLD AUTO: 45.9 % (ref 37.5–51)
HGB BLD-MCNC: 15.7 G/DL (ref 13–17.7)
IMM GRANULOCYTES # BLD AUTO: 0.01 10*3/MM3 (ref 0–0.05)
IMM GRANULOCYTES NFR BLD AUTO: 0.2 % (ref 0–0.5)
INR PPP: 0.99 (ref 0.9–1.1)
LYMPHOCYTES # BLD AUTO: 0.98 10*3/MM3 (ref 0.7–3.1)
LYMPHOCYTES NFR BLD AUTO: 19.3 % (ref 19.6–45.3)
MCH RBC QN AUTO: 32.6 PG (ref 26.6–33)
MCHC RBC AUTO-ENTMCNC: 34.2 G/DL (ref 31.5–35.7)
MCV RBC AUTO: 95.4 FL (ref 79–97)
MONOCYTES # BLD AUTO: 0.37 10*3/MM3 (ref 0.1–0.9)
MONOCYTES NFR BLD AUTO: 7.3 % (ref 5–12)
NEUTROPHILS NFR BLD AUTO: 3.65 10*3/MM3 (ref 1.7–7)
NEUTROPHILS NFR BLD AUTO: 71.6 % (ref 42.7–76)
NRBC BLD AUTO-RTO: 0 /100 WBC (ref 0–0.2)
PLATELET # BLD AUTO: 235 10*3/MM3 (ref 140–450)
PMV BLD AUTO: 10.9 FL (ref 6–12)
POTASSIUM SERPL-SCNC: 4.5 MMOL/L (ref 3.5–5.2)
PROT SERPL-MCNC: 7.1 G/DL (ref 6–8.5)
PROTHROMBIN TIME: 12.9 SECONDS (ref 11.7–14.2)
RBC # BLD AUTO: 4.81 10*6/MM3 (ref 4.14–5.8)
SARS-COV-2 RNA PNL SPEC NAA+PROBE: NOT DETECTED
SODIUM SERPL-SCNC: 139 MMOL/L (ref 136–145)
WBC # BLD AUTO: 5.09 10*3/MM3 (ref 3.4–10.8)

## 2021-10-20 PROCEDURE — 25010000002 DEXAMETHASONE PER 1 MG: Performed by: PHYSICIAN ASSISTANT

## 2021-10-20 PROCEDURE — 87635 SARS-COV-2 COVID-19 AMP PRB: CPT | Performed by: PHYSICIAN ASSISTANT

## 2021-10-20 PROCEDURE — 80053 COMPREHEN METABOLIC PANEL: CPT | Performed by: PHYSICIAN ASSISTANT

## 2021-10-20 PROCEDURE — 99214 OFFICE O/P EST MOD 30 MIN: CPT | Performed by: NURSE PRACTITIONER

## 2021-10-20 PROCEDURE — 25010000002 ONDANSETRON PER 1 MG: Performed by: PHYSICIAN ASSISTANT

## 2021-10-20 PROCEDURE — 25010000002 MORPHINE PER 10 MG: Performed by: EMERGENCY MEDICINE

## 2021-10-20 PROCEDURE — 72141 MRI NECK SPINE W/O DYE: CPT

## 2021-10-20 PROCEDURE — G0378 HOSPITAL OBSERVATION PER HR: HCPCS

## 2021-10-20 PROCEDURE — 36415 COLL VENOUS BLD VENIPUNCTURE: CPT

## 2021-10-20 PROCEDURE — 85610 PROTHROMBIN TIME: CPT | Performed by: PHYSICIAN ASSISTANT

## 2021-10-20 PROCEDURE — 99285 EMERGENCY DEPT VISIT HI MDM: CPT

## 2021-10-20 PROCEDURE — 25010000002 DEXAMETHASONE PER 1 MG: Performed by: NURSE PRACTITIONER

## 2021-10-20 PROCEDURE — 96376 TX/PRO/DX INJ SAME DRUG ADON: CPT

## 2021-10-20 PROCEDURE — 96374 THER/PROPH/DIAG INJ IV PUSH: CPT

## 2021-10-20 PROCEDURE — 85730 THROMBOPLASTIN TIME PARTIAL: CPT | Performed by: PHYSICIAN ASSISTANT

## 2021-10-20 PROCEDURE — C9803 HOPD COVID-19 SPEC COLLECT: HCPCS

## 2021-10-20 PROCEDURE — 96375 TX/PRO/DX INJ NEW DRUG ADDON: CPT

## 2021-10-20 PROCEDURE — 85025 COMPLETE CBC W/AUTO DIFF WBC: CPT | Performed by: PHYSICIAN ASSISTANT

## 2021-10-20 RX ORDER — ONDANSETRON 2 MG/ML
4 INJECTION INTRAMUSCULAR; INTRAVENOUS EVERY 6 HOURS PRN
Status: DISCONTINUED | OUTPATIENT
Start: 2021-10-20 | End: 2021-10-21 | Stop reason: SDUPTHER

## 2021-10-20 RX ORDER — ONDANSETRON 2 MG/ML
4 INJECTION INTRAMUSCULAR; INTRAVENOUS ONCE
Status: COMPLETED | OUTPATIENT
Start: 2021-10-20 | End: 2021-10-20

## 2021-10-20 RX ORDER — MORPHINE SULFATE 2 MG/ML
4 INJECTION, SOLUTION INTRAMUSCULAR; INTRAVENOUS ONCE
Status: COMPLETED | OUTPATIENT
Start: 2021-10-20 | End: 2021-10-20

## 2021-10-20 RX ORDER — FAMOTIDINE 10 MG/ML
20 INJECTION, SOLUTION INTRAVENOUS EVERY 12 HOURS SCHEDULED
Status: DISCONTINUED | OUTPATIENT
Start: 2021-10-20 | End: 2021-10-21

## 2021-10-20 RX ORDER — HYDROCODONE BITARTRATE AND ACETAMINOPHEN 7.5; 325 MG/1; MG/1
1 TABLET ORAL EVERY 6 HOURS PRN
Status: DISCONTINUED | OUTPATIENT
Start: 2021-10-20 | End: 2021-10-21

## 2021-10-20 RX ORDER — DEXAMETHASONE SODIUM PHOSPHATE 10 MG/ML
10 INJECTION INTRAMUSCULAR; INTRAVENOUS ONCE
Status: COMPLETED | OUTPATIENT
Start: 2021-10-20 | End: 2021-10-20

## 2021-10-20 RX ORDER — NITROGLYCERIN 0.4 MG/1
0.4 TABLET SUBLINGUAL
Status: DISCONTINUED | OUTPATIENT
Start: 2021-10-20 | End: 2021-10-22 | Stop reason: HOSPADM

## 2021-10-20 RX ORDER — ATORVASTATIN CALCIUM 20 MG/1
10 TABLET, FILM COATED ORAL DAILY
Status: DISCONTINUED | OUTPATIENT
Start: 2021-10-21 | End: 2021-10-22 | Stop reason: HOSPADM

## 2021-10-20 RX ORDER — DEXAMETHASONE SODIUM PHOSPHATE 4 MG/ML
4 INJECTION, SOLUTION INTRA-ARTICULAR; INTRALESIONAL; INTRAMUSCULAR; INTRAVENOUS; SOFT TISSUE EVERY 6 HOURS
Status: DISCONTINUED | OUTPATIENT
Start: 2021-10-20 | End: 2021-10-21

## 2021-10-20 RX ORDER — FAMOTIDINE 10 MG/ML
20 INJECTION, SOLUTION INTRAVENOUS ONCE
Status: COMPLETED | OUTPATIENT
Start: 2021-10-20 | End: 2021-10-20

## 2021-10-20 RX ORDER — UREA 10 %
3 LOTION (ML) TOPICAL NIGHTLY PRN
Status: DISCONTINUED | OUTPATIENT
Start: 2021-10-20 | End: 2021-10-22 | Stop reason: HOSPADM

## 2021-10-20 RX ORDER — ACETAMINOPHEN 325 MG/1
650 TABLET ORAL EVERY 4 HOURS PRN
Status: DISCONTINUED | OUTPATIENT
Start: 2021-10-20 | End: 2021-10-22 | Stop reason: HOSPADM

## 2021-10-20 RX ORDER — ONDANSETRON 4 MG/1
4 TABLET, FILM COATED ORAL EVERY 6 HOURS PRN
Status: DISCONTINUED | OUTPATIENT
Start: 2021-10-20 | End: 2021-10-21 | Stop reason: SDUPTHER

## 2021-10-20 RX ADMIN — MORPHINE SULFATE 4 MG: 2 INJECTION, SOLUTION INTRAMUSCULAR; INTRAVENOUS at 15:35

## 2021-10-20 RX ADMIN — SODIUM CHLORIDE 1000 ML: 9 INJECTION, SOLUTION INTRAVENOUS at 09:48

## 2021-10-20 RX ADMIN — FAMOTIDINE 20 MG: 10 INJECTION, SOLUTION INTRAVENOUS at 21:14

## 2021-10-20 RX ADMIN — DEXAMETHASONE SODIUM PHOSPHATE 10 MG: 10 INJECTION INTRAMUSCULAR; INTRAVENOUS at 09:59

## 2021-10-20 RX ADMIN — DEXAMETHASONE SODIUM PHOSPHATE 4 MG: 4 INJECTION, SOLUTION INTRAMUSCULAR; INTRAVENOUS at 16:49

## 2021-10-20 RX ADMIN — ONDANSETRON 4 MG: 2 INJECTION INTRAMUSCULAR; INTRAVENOUS at 09:50

## 2021-10-20 RX ADMIN — MORPHINE SULFATE 4 MG: 2 INJECTION, SOLUTION INTRAMUSCULAR; INTRAVENOUS at 09:52

## 2021-10-20 RX ADMIN — FAMOTIDINE 20 MG: 10 INJECTION, SOLUTION INTRAVENOUS at 10:07

## 2021-10-20 RX ADMIN — ACETAMINOPHEN 650 MG: 325 TABLET, FILM COATED ORAL at 21:25

## 2021-10-20 RX ADMIN — DEXAMETHASONE SODIUM PHOSPHATE 4 MG: 4 INJECTION, SOLUTION INTRAMUSCULAR; INTRAVENOUS at 21:14

## 2021-10-21 ENCOUNTER — APPOINTMENT (OUTPATIENT)
Dept: GENERAL RADIOLOGY | Facility: HOSPITAL | Age: 56
End: 2021-10-21

## 2021-10-21 ENCOUNTER — ANESTHESIA (OUTPATIENT)
Dept: PERIOP | Facility: HOSPITAL | Age: 56
End: 2021-10-21

## 2021-10-21 ENCOUNTER — ANESTHESIA EVENT (OUTPATIENT)
Dept: PERIOP | Facility: HOSPITAL | Age: 56
End: 2021-10-21

## 2021-10-21 LAB
ANION GAP SERPL CALCULATED.3IONS-SCNC: 16.2 MMOL/L (ref 5–15)
BUN SERPL-MCNC: 15 MG/DL (ref 6–20)
BUN/CREAT SERPL: 20.5 (ref 7–25)
CALCIUM SPEC-SCNC: 9 MG/DL (ref 8.6–10.5)
CHLORIDE SERPL-SCNC: 104 MMOL/L (ref 98–107)
CO2 SERPL-SCNC: 15.8 MMOL/L (ref 22–29)
CREAT SERPL-MCNC: 0.73 MG/DL (ref 0.76–1.27)
DEPRECATED RDW RBC AUTO: 46.8 FL (ref 37–54)
ERYTHROCYTE [DISTWIDTH] IN BLOOD BY AUTOMATED COUNT: 13.1 % (ref 12.3–15.4)
GFR SERPL CREATININE-BSD FRML MDRD: 110 ML/MIN/1.73
GLUCOSE SERPL-MCNC: 151 MG/DL (ref 65–99)
HCT VFR BLD AUTO: 47.7 % (ref 37.5–51)
HGB BLD-MCNC: 16.3 G/DL (ref 13–17.7)
MCH RBC QN AUTO: 32.9 PG (ref 26.6–33)
MCHC RBC AUTO-ENTMCNC: 34.2 G/DL (ref 31.5–35.7)
MCV RBC AUTO: 96.4 FL (ref 79–97)
PLATELET # BLD AUTO: 251 10*3/MM3 (ref 140–450)
PMV BLD AUTO: 11 FL (ref 6–12)
POTASSIUM SERPL-SCNC: 4.7 MMOL/L (ref 3.5–5.2)
RBC # BLD AUTO: 4.95 10*6/MM3 (ref 4.14–5.8)
SODIUM SERPL-SCNC: 136 MMOL/L (ref 136–145)
WBC # BLD AUTO: 12.56 10*3/MM3 (ref 3.4–10.8)

## 2021-10-21 PROCEDURE — 80048 BASIC METABOLIC PNL TOTAL CA: CPT | Performed by: INTERNAL MEDICINE

## 2021-10-21 PROCEDURE — 25010000002 FENTANYL CITRATE (PF) 50 MCG/ML SOLUTION: Performed by: ANESTHESIOLOGY

## 2021-10-21 PROCEDURE — 25010000002 DEXAMETHASONE PER 1 MG: Performed by: NURSE ANESTHETIST, CERTIFIED REGISTERED

## 2021-10-21 PROCEDURE — 25010000002 ONDANSETRON PER 1 MG: Performed by: STUDENT IN AN ORGANIZED HEALTH CARE EDUCATION/TRAINING PROGRAM

## 2021-10-21 PROCEDURE — 25010000002 PROPOFOL 10 MG/ML EMULSION: Performed by: NURSE ANESTHETIST, CERTIFIED REGISTERED

## 2021-10-21 PROCEDURE — 25010000002 HYDROMORPHONE PER 4 MG: Performed by: ANESTHESIOLOGY

## 2021-10-21 PROCEDURE — 0 CEFAZOLIN IN DEXTROSE 2-4 GM/100ML-% SOLUTION: Performed by: NEUROLOGICAL SURGERY

## 2021-10-21 PROCEDURE — 85027 COMPLETE CBC AUTOMATED: CPT | Performed by: INTERNAL MEDICINE

## 2021-10-21 PROCEDURE — 63020 LAMOT DCMPRN NRV RT 1 CERV: CPT | Performed by: NEUROLOGICAL SURGERY

## 2021-10-21 PROCEDURE — 25010000002 MIDAZOLAM PER 1 MG: Performed by: ANESTHESIOLOGY

## 2021-10-21 PROCEDURE — C1889 IMPLANT/INSERT DEVICE, NOC: HCPCS | Performed by: NEUROLOGICAL SURGERY

## 2021-10-21 PROCEDURE — 72040 X-RAY EXAM NECK SPINE 2-3 VW: CPT

## 2021-10-21 PROCEDURE — 25010000002 FENTANYL CITRATE (PF) 50 MCG/ML SOLUTION: Performed by: NURSE ANESTHETIST, CERTIFIED REGISTERED

## 2021-10-21 PROCEDURE — 25010000002 PHENYLEPHRINE 10 MG/ML SOLUTION: Performed by: ANESTHESIOLOGY

## 2021-10-21 PROCEDURE — 63020 LAMOT DCMPRN NRV RT 1 CERV: CPT | Performed by: SPECIALIST/TECHNOLOGIST, OTHER

## 2021-10-21 PROCEDURE — 25010000002 METHOCARBAMOL 1000 MG/10ML SOLUTION: Performed by: NEUROLOGICAL SURGERY

## 2021-10-21 PROCEDURE — 25010000003 CEFAZOLIN PER 500 MG: Performed by: NEUROLOGICAL SURGERY

## 2021-10-21 PROCEDURE — 96376 TX/PRO/DX INJ SAME DRUG ADON: CPT

## 2021-10-21 PROCEDURE — 99214 OFFICE O/P EST MOD 30 MIN: CPT | Performed by: NURSE PRACTITIONER

## 2021-10-21 PROCEDURE — 0 CEFAZOLIN PER 500 MG: Performed by: NEUROLOGICAL SURGERY

## 2021-10-21 PROCEDURE — G0378 HOSPITAL OBSERVATION PER HR: HCPCS

## 2021-10-21 PROCEDURE — 76000 FLUOROSCOPY <1 HR PHYS/QHP: CPT

## 2021-10-21 PROCEDURE — 25010000003 CEFAZOLIN IN DEXTROSE 2-4 GM/100ML-% SOLUTION: Performed by: NEUROLOGICAL SURGERY

## 2021-10-21 PROCEDURE — 25010000002 DEXAMETHASONE PER 1 MG: Performed by: NURSE PRACTITIONER

## 2021-10-21 PROCEDURE — 25010000002 NEOSTIGMINE 5 MG/10ML SOLUTION: Performed by: STUDENT IN AN ORGANIZED HEALTH CARE EDUCATION/TRAINING PROGRAM

## 2021-10-21 DEVICE — SSC BONE WAX
Type: IMPLANTABLE DEVICE | Site: SPINE CERVICAL | Status: FUNCTIONAL
Brand: SSC BONE WAX

## 2021-10-21 DEVICE — FLOSEAL HEMOSTATIC MATRIX, 10ML
Type: IMPLANTABLE DEVICE | Site: SPINE CERVICAL | Status: FUNCTIONAL
Brand: FLOSEAL HEMOSTATIC MATRIX

## 2021-10-21 RX ORDER — FENTANYL CITRATE 50 UG/ML
50 INJECTION, SOLUTION INTRAMUSCULAR; INTRAVENOUS
Status: DISCONTINUED | OUTPATIENT
Start: 2021-10-21 | End: 2021-10-21 | Stop reason: HOSPADM

## 2021-10-21 RX ORDER — MIDAZOLAM HYDROCHLORIDE 1 MG/ML
1 INJECTION INTRAMUSCULAR; INTRAVENOUS
Status: DISCONTINUED | OUTPATIENT
Start: 2021-10-21 | End: 2021-10-21 | Stop reason: HOSPADM

## 2021-10-21 RX ORDER — SODIUM CHLORIDE 0.9 % (FLUSH) 0.9 %
3 SYRINGE (ML) INJECTION EVERY 12 HOURS SCHEDULED
Status: DISCONTINUED | OUTPATIENT
Start: 2021-10-21 | End: 2021-10-21 | Stop reason: HOSPADM

## 2021-10-21 RX ORDER — LABETALOL HYDROCHLORIDE 5 MG/ML
5 INJECTION, SOLUTION INTRAVENOUS
Status: DISCONTINUED | OUTPATIENT
Start: 2021-10-21 | End: 2021-10-21 | Stop reason: HOSPADM

## 2021-10-21 RX ORDER — DEXAMETHASONE SODIUM PHOSPHATE 10 MG/ML
INJECTION INTRAMUSCULAR; INTRAVENOUS AS NEEDED
Status: DISCONTINUED | OUTPATIENT
Start: 2021-10-21 | End: 2021-10-21 | Stop reason: SURG

## 2021-10-21 RX ORDER — ACETAMINOPHEN 500 MG
1000 TABLET ORAL ONCE
Status: COMPLETED | OUTPATIENT
Start: 2021-10-21 | End: 2021-10-21

## 2021-10-21 RX ORDER — ONDANSETRON 4 MG/1
4 TABLET, FILM COATED ORAL EVERY 6 HOURS PRN
Status: DISCONTINUED | OUTPATIENT
Start: 2021-10-21 | End: 2021-10-22 | Stop reason: HOSPADM

## 2021-10-21 RX ORDER — HYDRALAZINE HYDROCHLORIDE 20 MG/ML
5 INJECTION INTRAMUSCULAR; INTRAVENOUS
Status: DISCONTINUED | OUTPATIENT
Start: 2021-10-21 | End: 2021-10-21 | Stop reason: HOSPADM

## 2021-10-21 RX ORDER — ONDANSETRON 2 MG/ML
4 INJECTION INTRAMUSCULAR; INTRAVENOUS ONCE AS NEEDED
Status: DISCONTINUED | OUTPATIENT
Start: 2021-10-21 | End: 2021-10-21 | Stop reason: HOSPADM

## 2021-10-21 RX ORDER — LIDOCAINE HYDROCHLORIDE 20 MG/ML
INJECTION, SOLUTION INFILTRATION; PERINEURAL AS NEEDED
Status: DISCONTINUED | OUTPATIENT
Start: 2021-10-21 | End: 2021-10-21 | Stop reason: SURG

## 2021-10-21 RX ORDER — HYDROMORPHONE HYDROCHLORIDE 1 MG/ML
0.5 INJECTION, SOLUTION INTRAMUSCULAR; INTRAVENOUS; SUBCUTANEOUS
Status: DISCONTINUED | OUTPATIENT
Start: 2021-10-21 | End: 2021-10-22 | Stop reason: HOSPADM

## 2021-10-21 RX ORDER — CELECOXIB 100 MG/1
100 CAPSULE ORAL ONCE
Status: COMPLETED | OUTPATIENT
Start: 2021-10-21 | End: 2021-10-21

## 2021-10-21 RX ORDER — ACETAMINOPHEN 325 MG/1
650 TABLET ORAL EVERY 4 HOURS PRN
Status: DISCONTINUED | OUTPATIENT
Start: 2021-10-21 | End: 2021-10-21 | Stop reason: SDUPTHER

## 2021-10-21 RX ORDER — DIPHENHYDRAMINE HCL 25 MG
25 CAPSULE ORAL
Status: DISCONTINUED | OUTPATIENT
Start: 2021-10-21 | End: 2021-10-21 | Stop reason: HOSPADM

## 2021-10-21 RX ORDER — DOCUSATE SODIUM 100 MG/1
100 CAPSULE, LIQUID FILLED ORAL 2 TIMES DAILY PRN
Status: DISCONTINUED | OUTPATIENT
Start: 2021-10-21 | End: 2021-10-22 | Stop reason: HOSPADM

## 2021-10-21 RX ORDER — ONDANSETRON 2 MG/ML
4 INJECTION INTRAMUSCULAR; INTRAVENOUS EVERY 6 HOURS PRN
Status: DISCONTINUED | OUTPATIENT
Start: 2021-10-21 | End: 2021-10-22 | Stop reason: HOSPADM

## 2021-10-21 RX ORDER — NALOXONE HCL 0.4 MG/ML
0.4 VIAL (ML) INJECTION
Status: DISCONTINUED | OUTPATIENT
Start: 2021-10-21 | End: 2021-10-22 | Stop reason: HOSPADM

## 2021-10-21 RX ORDER — HYDROCODONE BITARTRATE AND ACETAMINOPHEN 7.5; 325 MG/1; MG/1
1 TABLET ORAL EVERY 4 HOURS PRN
Status: DISCONTINUED | OUTPATIENT
Start: 2021-10-21 | End: 2021-10-21 | Stop reason: SDUPTHER

## 2021-10-21 RX ORDER — PROPOFOL 10 MG/ML
VIAL (ML) INTRAVENOUS AS NEEDED
Status: DISCONTINUED | OUTPATIENT
Start: 2021-10-21 | End: 2021-10-21 | Stop reason: SURG

## 2021-10-21 RX ORDER — GLYCOPYRROLATE 0.2 MG/ML
INJECTION INTRAMUSCULAR; INTRAVENOUS AS NEEDED
Status: DISCONTINUED | OUTPATIENT
Start: 2021-10-21 | End: 2021-10-21 | Stop reason: SURG

## 2021-10-21 RX ORDER — PROMETHAZINE HYDROCHLORIDE 25 MG/1
25 TABLET ORAL ONCE AS NEEDED
Status: DISCONTINUED | OUTPATIENT
Start: 2021-10-21 | End: 2021-10-21 | Stop reason: HOSPADM

## 2021-10-21 RX ORDER — ROCURONIUM BROMIDE 10 MG/ML
INJECTION, SOLUTION INTRAVENOUS AS NEEDED
Status: DISCONTINUED | OUTPATIENT
Start: 2021-10-21 | End: 2021-10-21 | Stop reason: SURG

## 2021-10-21 RX ORDER — FENTANYL CITRATE 50 UG/ML
INJECTION, SOLUTION INTRAMUSCULAR; INTRAVENOUS AS NEEDED
Status: DISCONTINUED | OUTPATIENT
Start: 2021-10-21 | End: 2021-10-21 | Stop reason: SURG

## 2021-10-21 RX ORDER — KETAMINE HYDROCHLORIDE 10 MG/ML
INJECTION INTRAMUSCULAR; INTRAVENOUS AS NEEDED
Status: DISCONTINUED | OUTPATIENT
Start: 2021-10-21 | End: 2021-10-21 | Stop reason: SURG

## 2021-10-21 RX ORDER — NEOSTIGMINE METHYLSULFATE 0.5 MG/ML
INJECTION, SOLUTION INTRAVENOUS AS NEEDED
Status: DISCONTINUED | OUTPATIENT
Start: 2021-10-21 | End: 2021-10-21 | Stop reason: SURG

## 2021-10-21 RX ORDER — GABAPENTIN 300 MG/1
600 CAPSULE ORAL ONCE
Status: COMPLETED | OUTPATIENT
Start: 2021-10-21 | End: 2021-10-21

## 2021-10-21 RX ORDER — FAMOTIDINE 10 MG/ML
20 INJECTION, SOLUTION INTRAVENOUS ONCE
Status: DISCONTINUED | OUTPATIENT
Start: 2021-10-21 | End: 2021-10-21

## 2021-10-21 RX ORDER — SODIUM CHLORIDE 0.9 % (FLUSH) 0.9 %
10 SYRINGE (ML) INJECTION AS NEEDED
Status: DISCONTINUED | OUTPATIENT
Start: 2021-10-21 | End: 2021-10-22 | Stop reason: HOSPADM

## 2021-10-21 RX ORDER — METHOCARBAMOL 100 MG/ML
1000 INJECTION, SOLUTION INTRAMUSCULAR; INTRAVENOUS ONCE
Status: COMPLETED | OUTPATIENT
Start: 2021-10-21 | End: 2021-10-21

## 2021-10-21 RX ORDER — DIPHENHYDRAMINE HYDROCHLORIDE 50 MG/ML
12.5 INJECTION INTRAMUSCULAR; INTRAVENOUS
Status: DISCONTINUED | OUTPATIENT
Start: 2021-10-21 | End: 2021-10-21 | Stop reason: HOSPADM

## 2021-10-21 RX ORDER — CYCLOBENZAPRINE HCL 10 MG
10 TABLET ORAL 3 TIMES DAILY PRN
Status: DISCONTINUED | OUTPATIENT
Start: 2021-10-21 | End: 2021-10-22 | Stop reason: HOSPADM

## 2021-10-21 RX ORDER — CEFAZOLIN SODIUM 2 G/100ML
2 INJECTION, SOLUTION INTRAVENOUS ONCE
Status: COMPLETED | OUTPATIENT
Start: 2021-10-21 | End: 2021-10-21

## 2021-10-21 RX ORDER — FLUMAZENIL 0.1 MG/ML
0.2 INJECTION INTRAVENOUS AS NEEDED
Status: DISCONTINUED | OUTPATIENT
Start: 2021-10-21 | End: 2021-10-21 | Stop reason: HOSPADM

## 2021-10-21 RX ORDER — NALOXONE HCL 0.4 MG/ML
0.2 VIAL (ML) INJECTION AS NEEDED
Status: DISCONTINUED | OUTPATIENT
Start: 2021-10-21 | End: 2021-10-21 | Stop reason: HOSPADM

## 2021-10-21 RX ORDER — PHENYLEPHRINE HYDROCHLORIDE 10 MG/ML
INJECTION INTRAVENOUS AS NEEDED
Status: DISCONTINUED | OUTPATIENT
Start: 2021-10-21 | End: 2021-10-21 | Stop reason: SURG

## 2021-10-21 RX ORDER — MIDAZOLAM HYDROCHLORIDE 1 MG/ML
1 INJECTION INTRAMUSCULAR; INTRAVENOUS
Status: COMPLETED | OUTPATIENT
Start: 2021-10-21 | End: 2021-10-21

## 2021-10-21 RX ORDER — SODIUM CHLORIDE 0.9 % (FLUSH) 0.9 %
3-10 SYRINGE (ML) INJECTION AS NEEDED
Status: DISCONTINUED | OUTPATIENT
Start: 2021-10-21 | End: 2021-10-21 | Stop reason: HOSPADM

## 2021-10-21 RX ORDER — AMOXICILLIN 250 MG
1 CAPSULE ORAL NIGHTLY PRN
Status: DISCONTINUED | OUTPATIENT
Start: 2021-10-21 | End: 2021-10-22 | Stop reason: HOSPADM

## 2021-10-21 RX ORDER — ONDANSETRON 2 MG/ML
INJECTION INTRAMUSCULAR; INTRAVENOUS AS NEEDED
Status: DISCONTINUED | OUTPATIENT
Start: 2021-10-21 | End: 2021-10-21 | Stop reason: SURG

## 2021-10-21 RX ORDER — LIDOCAINE HYDROCHLORIDE 10 MG/ML
0.5 INJECTION, SOLUTION EPIDURAL; INFILTRATION; INTRACAUDAL; PERINEURAL ONCE AS NEEDED
Status: DISCONTINUED | OUTPATIENT
Start: 2021-10-21 | End: 2021-10-21 | Stop reason: HOSPADM

## 2021-10-21 RX ORDER — PROMETHAZINE HYDROCHLORIDE 25 MG/1
25 SUPPOSITORY RECTAL ONCE AS NEEDED
Status: DISCONTINUED | OUTPATIENT
Start: 2021-10-21 | End: 2021-10-21 | Stop reason: HOSPADM

## 2021-10-21 RX ORDER — HYDROCODONE BITARTRATE AND ACETAMINOPHEN 7.5; 325 MG/1; MG/1
1 TABLET ORAL EVERY 4 HOURS PRN
Status: DISCONTINUED | OUTPATIENT
Start: 2021-10-21 | End: 2021-10-22 | Stop reason: HOSPADM

## 2021-10-21 RX ORDER — EPHEDRINE SULFATE 50 MG/ML
5 INJECTION, SOLUTION INTRAVENOUS ONCE AS NEEDED
Status: DISCONTINUED | OUTPATIENT
Start: 2021-10-21 | End: 2021-10-21 | Stop reason: HOSPADM

## 2021-10-21 RX ORDER — SODIUM CHLORIDE 9 MG/ML
100 INJECTION, SOLUTION INTRAVENOUS CONTINUOUS
Status: DISCONTINUED | OUTPATIENT
Start: 2021-10-21 | End: 2021-10-22 | Stop reason: HOSPADM

## 2021-10-21 RX ORDER — HYDROMORPHONE HYDROCHLORIDE 1 MG/ML
0.5 INJECTION, SOLUTION INTRAMUSCULAR; INTRAVENOUS; SUBCUTANEOUS
Status: DISCONTINUED | OUTPATIENT
Start: 2021-10-21 | End: 2021-10-21 | Stop reason: HOSPADM

## 2021-10-21 RX ORDER — HYDROMORPHONE HCL 110MG/55ML
PATIENT CONTROLLED ANALGESIA SYRINGE INTRAVENOUS AS NEEDED
Status: DISCONTINUED | OUTPATIENT
Start: 2021-10-21 | End: 2021-10-21 | Stop reason: SURG

## 2021-10-21 RX ORDER — EPHEDRINE SULFATE 50 MG/ML
INJECTION, SOLUTION INTRAVENOUS AS NEEDED
Status: DISCONTINUED | OUTPATIENT
Start: 2021-10-21 | End: 2021-10-21 | Stop reason: SURG

## 2021-10-21 RX ORDER — SODIUM CHLORIDE 0.9 % (FLUSH) 0.9 %
10 SYRINGE (ML) INJECTION EVERY 12 HOURS SCHEDULED
Status: DISCONTINUED | OUTPATIENT
Start: 2021-10-21 | End: 2021-10-22 | Stop reason: HOSPADM

## 2021-10-21 RX ORDER — SODIUM CHLORIDE, SODIUM LACTATE, POTASSIUM CHLORIDE, CALCIUM CHLORIDE 600; 310; 30; 20 MG/100ML; MG/100ML; MG/100ML; MG/100ML
9 INJECTION, SOLUTION INTRAVENOUS CONTINUOUS
Status: DISCONTINUED | OUTPATIENT
Start: 2021-10-21 | End: 2021-10-21

## 2021-10-21 RX ORDER — BUPIVACAINE HYDROCHLORIDE AND EPINEPHRINE 2.5; 5 MG/ML; UG/ML
INJECTION, SOLUTION EPIDURAL; INFILTRATION; INTRACAUDAL; PERINEURAL AS NEEDED
Status: DISCONTINUED | OUTPATIENT
Start: 2021-10-21 | End: 2021-10-21 | Stop reason: HOSPADM

## 2021-10-21 RX ADMIN — ACETAMINOPHEN 650 MG: 325 TABLET, FILM COATED ORAL at 21:07

## 2021-10-21 RX ADMIN — SODIUM CHLORIDE 100 ML/HR: 9 INJECTION, SOLUTION INTRAVENOUS at 21:01

## 2021-10-21 RX ADMIN — SODIUM CHLORIDE, PRESERVATIVE FREE 10 ML: 5 INJECTION INTRAVENOUS at 21:02

## 2021-10-21 RX ADMIN — PHENYLEPHRINE HYDROCHLORIDE 100 MCG: 10 INJECTION, SOLUTION INTRAVENOUS at 18:01

## 2021-10-21 RX ADMIN — DEXAMETHASONE SODIUM PHOSPHATE 4 MG: 4 INJECTION, SOLUTION INTRAMUSCULAR; INTRAVENOUS at 03:06

## 2021-10-21 RX ADMIN — ROCURONIUM BROMIDE 20 MG: 50 INJECTION INTRAVENOUS at 17:18

## 2021-10-21 RX ADMIN — PHENYLEPHRINE HYDROCHLORIDE 100 MCG: 10 INJECTION, SOLUTION INTRAVENOUS at 17:39

## 2021-10-21 RX ADMIN — GLYCOPYRROLATE 0.8 MG: 0.2 INJECTION INTRAMUSCULAR; INTRAVENOUS at 18:11

## 2021-10-21 RX ADMIN — ACETAMINOPHEN 650 MG: 325 TABLET, FILM COATED ORAL at 03:06

## 2021-10-21 RX ADMIN — NEOSTIGMINE METHYLSULFATE 5 MG: 0.5 INJECTION INTRAVENOUS at 18:11

## 2021-10-21 RX ADMIN — PHENYLEPHRINE HYDROCHLORIDE 100 MCG: 10 INJECTION, SOLUTION INTRAVENOUS at 18:17

## 2021-10-21 RX ADMIN — ATORVASTATIN CALCIUM 10 MG: 20 TABLET, FILM COATED ORAL at 09:08

## 2021-10-21 RX ADMIN — HYDROMORPHONE HYDROCHLORIDE 0.5 MG: 2 INJECTION, SOLUTION INTRAMUSCULAR; INTRAVENOUS; SUBCUTANEOUS at 17:42

## 2021-10-21 RX ADMIN — SODIUM CHLORIDE, POTASSIUM CHLORIDE, SODIUM LACTATE AND CALCIUM CHLORIDE 9 ML/HR: 600; 310; 30; 20 INJECTION, SOLUTION INTRAVENOUS at 15:08

## 2021-10-21 RX ADMIN — METHOCARBAMOL 1000 MG: 100 INJECTION INTRAMUSCULAR; INTRAVENOUS at 19:00

## 2021-10-21 RX ADMIN — CEFAZOLIN SODIUM 2 G: 2 INJECTION, SOLUTION INTRAVENOUS at 16:35

## 2021-10-21 RX ADMIN — EPHEDRINE SULFATE 10 MG: 50 INJECTION INTRAVENOUS at 17:44

## 2021-10-21 RX ADMIN — FAMOTIDINE 20 MG: 10 INJECTION, SOLUTION INTRAVENOUS at 09:05

## 2021-10-21 RX ADMIN — KETAMINE HYDROCHLORIDE 30 MG: 10 INJECTION INTRAMUSCULAR; INTRAVENOUS at 17:39

## 2021-10-21 RX ADMIN — ONDANSETRON 4 MG: 2 INJECTION INTRAMUSCULAR; INTRAVENOUS at 18:17

## 2021-10-21 RX ADMIN — PHENYLEPHRINE HYDROCHLORIDE 100 MCG: 10 INJECTION, SOLUTION INTRAVENOUS at 17:35

## 2021-10-21 RX ADMIN — PROPOFOL 170 MG: 10 INJECTION, EMULSION INTRAVENOUS at 16:47

## 2021-10-21 RX ADMIN — GLYCOPYRROLATE 0.1 MG: 0.2 INJECTION INTRAMUSCULAR; INTRAVENOUS at 17:20

## 2021-10-21 RX ADMIN — EPHEDRINE SULFATE 10 MG: 50 INJECTION INTRAVENOUS at 17:18

## 2021-10-21 RX ADMIN — MIDAZOLAM 1 MG: 1 INJECTION INTRAMUSCULAR; INTRAVENOUS at 15:09

## 2021-10-21 RX ADMIN — DEXAMETHASONE SODIUM PHOSPHATE 4 MG: 4 INJECTION, SOLUTION INTRAMUSCULAR; INTRAVENOUS at 09:05

## 2021-10-21 RX ADMIN — ROCURONIUM BROMIDE 50 MG: 50 INJECTION INTRAVENOUS at 16:47

## 2021-10-21 RX ADMIN — MIDAZOLAM 1 MG: 1 INJECTION INTRAMUSCULAR; INTRAVENOUS at 16:02

## 2021-10-21 RX ADMIN — ACETAMINOPHEN 1000 MG: 500 TABLET, FILM COATED ORAL at 14:59

## 2021-10-21 RX ADMIN — GABAPENTIN 600 MG: 300 CAPSULE ORAL at 14:59

## 2021-10-21 RX ADMIN — DEXAMETHASONE SODIUM PHOSPHATE 10 MG: 10 INJECTION INTRAMUSCULAR; INTRAVENOUS at 17:15

## 2021-10-21 RX ADMIN — CELECOXIB 100 MG: 100 CAPSULE ORAL at 15:07

## 2021-10-21 RX ADMIN — FENTANYL CITRATE 50 MCG: 50 INJECTION INTRAMUSCULAR; INTRAVENOUS at 15:09

## 2021-10-21 RX ADMIN — FENTANYL CITRATE 100 MCG: 0.05 INJECTION, SOLUTION INTRAMUSCULAR; INTRAVENOUS at 16:47

## 2021-10-21 RX ADMIN — LIDOCAINE HYDROCHLORIDE 100 MG: 20 INJECTION, SOLUTION INFILTRATION; PERINEURAL at 16:47

## 2021-10-21 RX ADMIN — ACETAMINOPHEN 650 MG: 325 TABLET, FILM COATED ORAL at 09:08

## 2021-10-21 NOTE — ANESTHESIA PREPROCEDURE EVALUATION
Anesthesia Evaluation     Patient summary reviewed and Nursing notes reviewed   NPO Solid Status: > 8 hours  NPO Liquid Status: > 2 hours           Airway   Mallampati: I  TM distance: >3 FB  Neck ROM: full  No difficulty expected  Dental - normal exam     Pulmonary - negative pulmonary ROS and normal exam   Cardiovascular - normal exam  Exercise tolerance: good (4-7 METS)    (+) hyperlipidemia,       Neuro/Psych  (+) numbness,     GI/Hepatic/Renal/Endo - negative ROS     Musculoskeletal     (+) neck pain, radiculopathy Left upper extremity  Abdominal  - normal exam    Bowel sounds: normal.   Substance History - negative use     OB/GYN negative ob/gyn ROS         Other                        Anesthesia Plan    ASA 2     general     intravenous induction     Anesthetic plan, all risks, benefits, and alternatives have been provided, discussed and informed consent has been obtained with: patient.    Plan discussed with CRNA and attending.

## 2021-10-21 NOTE — ANESTHESIA POSTPROCEDURE EVALUATION
"Patient: Orion Pop    Procedure Summary     Date: 10/21/21 Room / Location: [facility] OR [address] [place] [address] MAIN OR    Anesthesia Start: 1642 Anesthesia Stop: 1846    Procedure: LEFT CERVICAL 7 AND THORACIC 1 POSTERIOR DISCECTOMY WITH METRIX (Left Spine Cervical) Diagnosis:     Surgeons: Lennox Bermudez MD Provider: Orion Bowling MD    Anesthesia Type: general ASA Status: 2          Anesthesia Type: general    Vitals  Vitals Value Taken Time   /72 10/21/21 1956   Temp 36.7 °C (98.1 °F) 10/21/21 1844   Pulse 59 10/21/21 1957   Resp 16 10/21/21 1940   SpO2 95 % 10/21/21 1957   Vitals shown include unvalidated device data.        Post Anesthesia Care and Evaluation    Patient location during evaluation: bedside  Patient participation: complete - patient participated  Level of consciousness: awake and alert  Pain management: adequate  Airway patency: patent  Anesthetic complications: No anesthetic complications    Cardiovascular status: acceptable  Respiratory status: acceptable  Hydration status: acceptable    Comments: /74   Pulse 86   Temp 36.7 °C (98.1 °F) (Oral)   Resp 16   Ht 182.9 cm (72\")   Wt 98.8 kg (217 lb 14.4 oz)   SpO2 97%   BMI 29.55 kg/m²       "

## 2021-10-21 NOTE — ANESTHESIA PROCEDURE NOTES
Airway  Urgency: elective    Date/Time: 10/21/2021 4:49 PM  Airway not difficult    General Information and Staff    Patient location during procedure: OR  CRNA: Aida Mijares CRNA    Indications and Patient Condition  Indications for airway management: airway protection    Preoxygenated: yes  Mask difficulty assessment: 1 - vent by mask    Final Airway Details  Final airway type: endotracheal airway      Successful airway: ETT  Cuffed: yes   Successful intubation technique: direct laryngoscopy  Endotracheal tube insertion site: oral  Blade: Cassie  Blade size: 4  ETT size (mm): 7.5  Cormack-Lehane Classification: grade I - full view of glottis  Placement verified by: chest auscultation and capnometry   Measured from: lips  ETT/EBT  to lips (cm): 23  Number of attempts at approach: 1  Assessment: lips, teeth, and gum same as pre-op and atraumatic intubation    Additional Comments   ett cuff up at [facility]OP

## 2021-10-22 ENCOUNTER — TELEPHONE (OUTPATIENT)
Dept: NEUROSURGERY | Facility: CLINIC | Age: 56
End: 2021-10-22

## 2021-10-22 ENCOUNTER — HOME HEALTH ADMISSION (OUTPATIENT)
Dept: HOME HEALTH SERVICES | Facility: HOME HEALTHCARE | Age: 56
End: 2021-10-22

## 2021-10-22 ENCOUNTER — READMISSION MANAGEMENT (OUTPATIENT)
Dept: CALL CENTER | Facility: HOSPITAL | Age: 56
End: 2021-10-22

## 2021-10-22 VITALS
WEIGHT: 218.5 LBS | TEMPERATURE: 98.1 F | HEIGHT: 72 IN | RESPIRATION RATE: 18 BRPM | BODY MASS INDEX: 29.59 KG/M2 | SYSTOLIC BLOOD PRESSURE: 143 MMHG | HEART RATE: 57 BPM | DIASTOLIC BLOOD PRESSURE: 79 MMHG | OXYGEN SATURATION: 92 %

## 2021-10-22 PROCEDURE — 97166 OT EVAL MOD COMPLEX 45 MIN: CPT

## 2021-10-22 PROCEDURE — 25010000002 INFLUENZA VAC SPLIT QUAD 0.5 ML SUSPENSION PREFILLED SYRINGE: Performed by: NEUROLOGICAL SURGERY

## 2021-10-22 PROCEDURE — G0008 ADMIN INFLUENZA VIRUS VAC: HCPCS | Performed by: NEUROLOGICAL SURGERY

## 2021-10-22 PROCEDURE — G0378 HOSPITAL OBSERVATION PER HR: HCPCS

## 2021-10-22 PROCEDURE — 90686 IIV4 VACC NO PRSV 0.5 ML IM: CPT | Performed by: NEUROLOGICAL SURGERY

## 2021-10-22 PROCEDURE — 97161 PT EVAL LOW COMPLEX 20 MIN: CPT

## 2021-10-22 PROCEDURE — 97530 THERAPEUTIC ACTIVITIES: CPT

## 2021-10-22 PROCEDURE — 99024 POSTOP FOLLOW-UP VISIT: CPT | Performed by: NURSE PRACTITIONER

## 2021-10-22 RX ORDER — AMOXICILLIN 250 MG
1 CAPSULE ORAL NIGHTLY PRN
Start: 2021-10-22 | End: 2021-12-03

## 2021-10-22 RX ORDER — CYCLOBENZAPRINE HCL 10 MG
10 TABLET ORAL 3 TIMES DAILY PRN
Qty: 30 TABLET | Refills: 1 | Status: SHIPPED | OUTPATIENT
Start: 2021-10-22 | End: 2021-12-03

## 2021-10-22 RX ORDER — HYDROCODONE BITARTRATE AND ACETAMINOPHEN 7.5; 325 MG/1; MG/1
1 TABLET ORAL EVERY 4 HOURS PRN
Qty: 12 TABLET | Refills: 0 | Status: SHIPPED | OUTPATIENT
Start: 2021-10-22 | End: 2021-10-27

## 2021-10-22 RX ADMIN — ATORVASTATIN CALCIUM 10 MG: 20 TABLET, FILM COATED ORAL at 08:53

## 2021-10-22 RX ADMIN — HYDROCODONE BITARTRATE AND ACETAMINOPHEN 1 TABLET: 7.5; 325 TABLET ORAL at 05:53

## 2021-10-22 RX ADMIN — SODIUM CHLORIDE 100 ML/HR: 9 INJECTION, SOLUTION INTRAVENOUS at 05:54

## 2021-10-22 RX ADMIN — INFLUENZA VIRUS VACCINE 0.5 ML: 15; 15; 15; 15 SUSPENSION INTRAMUSCULAR at 12:57

## 2021-10-22 RX ADMIN — ACETAMINOPHEN 650 MG: 325 TABLET, FILM COATED ORAL at 04:20

## 2021-10-22 NOTE — TELEPHONE ENCOUNTER
Patient status post C7/T1 posterior cervical discectomy 10/21 with Dr. SHEPHERD Needs follow-up 10 to 14 days for wound check.  It will be a postop visit.

## 2021-10-22 NOTE — OUTREACH NOTE
Prep Survey      Responses   [facility] patient discharged from? [place]   Is LACE score < 7 ? Yes   Emergency Room discharge w/ pulse ox? No   Eligibility [facility]   Date of Admission 10/20/21   Date of Discharge 10/22/21   Discharge Disposition Home or Self Care   Discharge diagnosis LEFT CERVICAL 7 AND THORACIC 1 POSTERIOR DISCECTOMY WITH METRIX   Does the patient have one of the following disease processes/diagnoses(primary or secondary)? General Surgery   Does the patient have Home health ordered? Yes   What is the Home health agency?  [facility]   Is there a DME ordered? No   Prep survey completed? Yes          Meghan Talbot RN

## 2021-10-25 ENCOUNTER — TELEPHONE (OUTPATIENT)
Dept: NEUROSURGERY | Facility: CLINIC | Age: 56
End: 2021-10-25

## 2021-10-25 ENCOUNTER — TRANSITIONAL CARE MANAGEMENT TELEPHONE ENCOUNTER (OUTPATIENT)
Dept: CALL CENTER | Facility: HOSPITAL | Age: 56
End: 2021-10-25

## 2021-10-25 NOTE — OUTREACH NOTE
Call Center TCM Note      Responses   [facility] [place] patient discharged from? [place]   Does the patient have one of the following disease processes/diagnoses(primary or secondary)? General Surgery   TCM attempt successful? Yes   Call start time 1716   Call end time 1717   Discharge diagnosis LEFT CERVICAL 7 AND THORACIC 1 POSTERIOR DISCECTOMY WITH METRIX   Meds reviewed with patient/caregiver? Yes   Is the patient having any side effects they believe may be caused by any medication additions or changes? No   Does the patient have all medications related to this admission filled (includes all antibiotics, pain medications, etc.) Yes   Is the patient taking all medications as directed (includes completed medication regime)? Yes   Does the patient have a follow up appointment scheduled with their surgeon? No   What is preventing the patient from scheduling follow up appointments? Waiting on return call   Nursing Interventions Advised patient to make appointment   Has the patient kept scheduled appointments due by today? N/A   What is the Home health agency?  [facility]   Has home health visited the patient within 72 hours of discharge? Call prior to 72 hours   Psychosocial issues? No   Did the patient receive a copy of their discharge instructions? Yes   Nursing interventions Reviewed instructions with patient   What is the patient's perception of their health status since discharge? Improving   Nursing interventions Nurse provided patient education   Is the patient /caregiver able to teach back basic post-op care? Continue use of incentive spirometry at least 1 week post discharge,  Practice 'cough and deep breath',  Drive as instructed by MD in discharge instructions,  Take showers only when approved by MD-sponge bathe until then,  No tub bath, swimming, or hot tub until instructed by MD,  Do not remove steri-strips,  Keep incision areas clean,dry and protected,  Lifting as instructed by MD in discharge  instructions   Is the patient/caregiver able to teach back signs and symptoms of incisional infection? Fever   Is the patient/caregiver able to teach back the hierarchy of who to call/visit for symptoms/problems? PCP, Specialist, Home health nurse, Urgent Care, ED, 911 Yes   TCM call completed? Yes   Wrap up additional comments Says he is doing well, no questions or concerns at this time, says he will be following up with his surgeon.          Dinorah Baptiste RN    10/25/2021, 17:17 EDT

## 2021-10-25 NOTE — TELEPHONE ENCOUNTER
Discharge instructions on the home health referral state that he should be seen by home health 2x a week. Patient advised that it is okay for these visits to be scheduled as this was per the direction of Dr. Bermudez. Patient also advised that we will be reaching out to get his post-op scheduled later on this week or early next week to get that scheduled.

## 2021-10-25 NOTE — TELEPHONE ENCOUNTER
Caller: Orion Pop A    Relationship to patient: Self    Best call back number: [phone]    Patient is needing:PT CALLED TO CHECK THE SCHEDULING OF HIS POST-OP APPT. I CALLED AND SPOKE WITH PUNEET AND SHE ADVISED THE SAME THING THAT WAS IS IN THE NOTE- WENT TO ADVISE PT OF THE MESSAGE AND PT STATED THAT HE HAD ANOTHER QUESTION REGARDING PHYSICAL THERAPY-TRIED TO WARM TRANSFER AS PT IS A NEW POST-OP-UNABLE TO GET A HOLD OF ANYONE-PT STATES THAT HE HAS BEEN GETTING CALLS FROM [facility] FOR SOMEONE TO COME TO HIS HOME TO START PHYSICAL THERAPY-PT STATES THAT HE THOUGHT  STATED HE CAN NOT BE INVOLVED WITH ANYTHING UNTIL HIS FIRST POST-OP VISIT-PLEASE ADVISE THANK YOU!

## 2021-10-25 NOTE — TELEPHONE ENCOUNTER
PT CALLED AND WAS ASKING ABOUT HIS POST-OP APPT. I ADVISED PT IS ON THE LIST TO BE CALLED AND WILL BE CALLED AS SOON AS THE SCHEDULES OPEN UP-THANK YOU!

## 2021-11-01 NOTE — PROGRESS NOTES
"Subjective   Patient ID: Orion Pop is a 56 y.o. male is here today for follow-up for post-operative visit status post LEFT CERVICAL 7 AND THORACIC 1 POSTERIOR DISCECTOMY WITH METRIX by Dr. Bermudez on 10/21/21.     History of Present Illness    Today Mr. Pop reports having his first is it with home health OT this week.  He was given some rubber bands to provide resistance for strengthening exercises in the last 2 fingers of the left hand.  He still has quite a bit of difficulty with left hand and left lower arm control.  The arm control seems to be related to movement from a seated position forgetting that he has the weakness issues in the left arm.  Overall, Mr. Pop feels that he is doing well.  The pain symptoms are better.  He is still having some numbness however it is not as severe as it was before surgery.  He also denies any incisional problems, no redness, swelling or drainage. He denies any fever or chills.       Review of Systems   Constitutional: Negative for chills and fever.   Musculoskeletal: Positive for neck pain.   Skin: Negative for wound.   Neurological: Positive for weakness and numbness.     Objective     Vitals:    11/02/21 1527   BP: 138/90   Pulse: 69   Temp: 97.8 °F (36.6 °C)   SpO2: 97%   Weight: 98.9 kg (218 lb)   Height: 182.9 cm (72\")     Body mass index is 29.57 kg/m².      Physical Exam  Constitutional:       General: He is not in acute distress.     Appearance: He is well-developed and normal weight. He is not ill-appearing.   Skin:     General: Skin is warm and dry.      Comments: The posterior cervical incision is well approximated. No redness, swelling or drainage.  Steri-Strips removed.   Neurological:      Mental Status: He is alert and oriented to person, place, and time.      Comments: The patient has good strength in the right arm however he exhibits left tricep and  strength 4-/5.  He has more significant weakness in the medial and lateral movements of the " last 2 fingers on the left.  He is unable to touch the thumb to the fifth finger and has difficulty touching the thumb to the fourth finger.  He is right-handed.        Assessment/Plan   I  Medical Decision Making:      Mr. Pop is doing well from a postop perspective but still has significant issues with weakness in the left arm and left hand.  I would like to get him transition to outpatient OT and PT to work on strengthening the left upper extremity.  We will make arrangements for the patient to be evaluated at [facility] acute rehab outpatient program.  I have asked for BIONESS stimulation to the left arm for aggressive strengthening exercises to hopefully prevent permanent weakness.    Mr. Pop has a fairly nonphysical job.  His employer is willing to accept restrictions.  The patient states that he spends most of his day on the computer.  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 No working over 4 hours a day.    We will re-evaluate progress with PT/OT in one month.        Diagnoses and all orders for this visit:    1. Surgical followup visit (Primary)  -     Cancel: Ambulatory Referral to Physical Therapy Evaluate and treat (for neck and UE strengthening with modalities and intro to HEP); Electrotherapy; E-stim; Strengthening, Stretching  -     Cancel: Ambulatory Referral to Occupational Therapy  -     Sling & Swathe Blair MD LG  -     Ambulatory Referral to Physical Therapy Evaluate and treat (for neck and UE strengthening with modalities and intro to HEP); Electrotherapy; E-stim; Strengthening, Stretching  -     Ambulatory Referral to Occupational Therapy    2. Left hand weakness  -     Cancel: Ambulatory Referral to Physical Therapy Evaluate and treat (for neck and UE strengthening with modalities and intro to HEP); Electrotherapy; E-stim; Strengthening, Stretching  -     Cancel: Ambulatory Referral to Occupational  Therapy  -     Sling & Swathe Barrington MD LG  -     Ambulatory Referral to Physical Therapy Evaluate and treat (for neck and UE strengthening with modalities and intro to HEP); Electrotherapy; E-stim; Strengthening, Stretching  -     Ambulatory Referral to Occupational Therapy    3. Muscle left arm weakness  -     Hank Boswell MD, LG      Return in about 1 month (around 12/2/2021) for Any available APRN or PA-C.

## 2021-11-02 ENCOUNTER — OFFICE VISIT (OUTPATIENT)
Dept: NEUROSURGERY | Facility: CLINIC | Age: 56
End: 2021-11-02

## 2021-11-02 VITALS
TEMPERATURE: 97.8 F | HEIGHT: 72 IN | DIASTOLIC BLOOD PRESSURE: 90 MMHG | WEIGHT: 218 LBS | SYSTOLIC BLOOD PRESSURE: 138 MMHG | BODY MASS INDEX: 29.53 KG/M2 | HEART RATE: 69 BPM | OXYGEN SATURATION: 97 %

## 2021-11-02 DIAGNOSIS — Z09 SURGICAL FOLLOWUP VISIT: Primary | ICD-10-CM

## 2021-11-02 DIAGNOSIS — R29.898 LEFT HAND WEAKNESS: ICD-10-CM

## 2021-11-02 DIAGNOSIS — M62.81 MUSCLE LEFT ARM WEAKNESS: ICD-10-CM

## 2021-11-02 PROCEDURE — 99024 POSTOP FOLLOW-UP VISIT: CPT | Performed by: NURSE PRACTITIONER

## 2021-11-02 RX ORDER — HYDROCODONE BITARTRATE AND ACETAMINOPHEN 5; 325 MG/1; MG/1
TABLET ORAL
COMMUNITY
Start: 2021-09-28 | End: 2021-12-03

## 2021-11-16 ENCOUNTER — HOSPITAL ENCOUNTER (OUTPATIENT)
Dept: OCCUPATIONAL THERAPY | Facility: HOSPITAL | Age: 56
Setting detail: THERAPIES SERIES
Discharge: HOME | End: 2021-11-16

## 2021-11-16 DIAGNOSIS — R29.898 WEAKNESS OF LEFT HAND: ICD-10-CM

## 2021-11-16 DIAGNOSIS — Z98.890 H/O MICRODISCECTOMY: Primary | ICD-10-CM

## 2021-11-16 PROCEDURE — 97166 OT EVAL MOD COMPLEX 45 MIN: CPT | Performed by: OCCUPATIONAL THERAPIST

## 2021-11-16 PROCEDURE — 97110 THERAPEUTIC EXERCISES: CPT | Performed by: OCCUPATIONAL THERAPIST

## 2021-11-16 NOTE — THERAPY EVALUATION
Outpatient Occupational Therapy Ortho Initial Evaluation  [facility]     Patient Name: Orion Pop  : 1965  MRN: [id number]  Today's Date: 2021      Visit Date: 2021    Patient Active Problem List   Diagnosis   • Cervical disc disorder at C5-C6 level with radiculopathy   • Gout   • Hyperlipidemia   • Special screening, prostate cancer   • Annual physical exam   • Weakness of left upper extremity   • Herniation of cervical intervertebral disc with radiculopathy   • Cervical disc disease   • Weakness of left arm        Past Medical History:   Diagnosis Date   • Cervical disc disease    • Gout    • Hyperlipidemia    • Hypertension    • Neck pain         Past Surgical History:   Procedure Laterality Date   • APPENDECTOMY     • CERVICAL DISCECTOMY POSTERIOR FUSION WITH BRAIN LAB Left 10/21/2021    Procedure: LEFT CERVICAL 7 AND THORACIC 1 POSTERIOR DISCECTOMY WITH METRIX;  Surgeon: Lennox Bermudez MD;  Location: [facility];  Service: Neurosurgery;  Laterality: Left;   • TONSILLECTOMY     • VASECTOMY           Visit Dx:    ICD-10-CM ICD-9-CM   1. H/O microdiscectomy  Z98.890 V15.29   2. Weakness of left hand  R29.898 728.87        Patient History     Row Name 21 1500             History    Chief Complaint Difficulty with daily activities; Joint stiffness; Muscle weakness  -SG      Type of Pain Elbow pain; Hand pain; Neck pain  -SG      Date Current Problem(s) Began 10/25/21  -SG      Brief Description of Current Complaint Left arm numb and weak after herniated disc  -SG      Patient/Caregiver Goals Return to prior level of function; Improve strength  -SG      Hand Dominance right-handed  -SG      Occupation/sports/leisure activities Backpacking, hiking, biking.  -SG      Patient seeing anyone else for problem(s)? No  -SG      What clinical tests have you had for this problem? MRI  -SG              Pain     Pain at Present 1  -SG              Fall Risk Assessment    Any falls in the  past year: No  -SG              Services    Prior Rehab/Home Health Experiences No  -SG      Are you currently receiving Home Health services No  -SG      Do you plan to receive Home Health services in the near future No  -SG              Daily Activities    Primary Language English  -SG      How does patient learn best? Listening  -SG              Safety    Are you being hurt, hit, or frightened by anyone at home or in your life? No  -SG      Are you being neglected by a caregiver No  -SG      Have you had any of the following issues with N/A  -SG            User Key  (r) = Recorded By, (t) = Taken By, (c) = Cosigned By    Initials Name Provider Type    Josephine Sr OTR Occupational Therapist                   OT Neuro     Row Name 11/16/21 0800             General ROM    LT Upper Ext Lt Shoulder Flexion; Lt Shoulder ABduction; Lt Shoulder External Rotation; Lt Shoulder Internal Rotation; Lt Elbow Supination; Lt Elbow Pronation; Lt Wrist Flexion; Lt Wrist Extension  -SG              Left Upper Ext    Lt Shoulder Abduction AROM WFL  -SG      Lt Shoulder Flexion AROM WFL  -SG      Lt Shoulder External Rotation AROM WFL  -SG      Lt Shoulder Internal Rotation AROM WFL  -SG      Lt Elbow Supination AROM WFL, decreased digit extension  -SG      Lt Elbow Pronation AROM WFL  -SG      Lt Wrist Flexion AROM 63  -SG      Lt Wrist Extension AROM 50', only extension in index finger  -SG            User Key  (r) = Recorded By, (t) = Taken By, (c) = Cosigned By    Initials Name Provider Type    Josephine Sr OTR Occupational Therapist              Hand Therapy (last 24 hours)     Hand Eval     Row Name 11/16/21 0800             Subjective Pain    Pre-Treatment Pain Level 1  -SG      Subjective Pain Comment Worst 3/10 elbow and hand  -SG              Hand ROM Tested?    Hand ROM Tested? Left Extension- AROM  -SG              Left Extension AROM    II- MP AROM 157  -SG      II- PIP AROM 90  -SG      II- DIP AROM 80   -SG      II- VIDAL Left Extension AROM 327  -SG      III- MP AROM 111  -SG      III- PIP AROM 85  -SG      III- DIP AROM 65  -SG      III- VIDAL Left Extension AROM 261  -SG      IV- MP AROM 110  -SG      IV- PIP AROM 84  -SG      IV- DIP AROM 40  -SG      IV- VIDAL Left Extension AROM 234  -SG      V- MP AROM 110  -SG      V- PIP AROM 85  -SG      V- DIP AROM 30  -SG      V- VIDAL Left Extension AROM 225  -SG              Hand  Strength     Strength Affected Side Bilateral  -SG               Strength Right    # Reps 3  -SG      Right Rung 2  -SG      Right  Test 1 108  -SG      Right  Test 2 110  -SG      Right  Test 3 112  -SG       Strength Average Right 110  -SG               Strength Left    # Reps 3  -SG      Left Rung 2  -SG      Left  Test 1 46  -SG      Left  Test 2 44  -SG      Left  Test 3 47  -SG       Strength Average Left 45.67  -SG              Pinch Strength    Affected Side Bilateral  -SG              Right Hand Strength - Pinch (lbs)    Lateral 22 lbs  -SG      Tip (2 point) 11 lbs  -SG              Left Hand Strength - Pinch (lbs)    Lateral 4 lbs  -SG      Tip (2 point) 8 lbs  -SG            User Key  (r) = Recorded By, (t) = Taken By, (c) = Cosigned By    Initials Name Provider Type    Josephine Sr OTR Occupational Therapist                    Therapy Education  Education Details: Discuss OT role and goals, POC; encoruaged proper LUE positioning at rest/sleep. Pt has sling but discussed support in other ways and keeping LUE supported while typing at work. Encouraged functional use of L hand at all times. Info given on NMES unit for home purchase.  Given: HEP, Symptoms/condition management, Posture/body mechanics  Program: New  How Provided: Verbal, Demonstration, Written  Provided to: Patient  Level of Understanding: Verbalized, Demonstrated     OT Goals     Row Name 11/16/21 1500          OT Short Term Goals    STG Date to Achieve 01/11/22  -      STG 1 Patient to be independent with fine motor coordination home program.  -     STG 2 Patient to improve 9 hole peg test score to </= 30 sec on LUE to demonstrate increased independence with fine motor tasks.  -     STG 3 Pt. and family to be independent with UE ROM HEP.  -     STG 4 Pt. to increase L  strength to 50# to increase independence with functional tasks.  -     STG 5 Pt to be instructed on home NMES unit for LUE to assist in improving ROM and strength and to prevent contracture deformity in LUE.  -            Long Term Goals    LTG Date to Achieve 02/08/22  -     LTG 1 Pt. to improve Box and Blocks score on LUE to 70 blocks on affected side to demonstrate increased independence with daily tasks.  -     LTG 2 Patient to improve 9 hole peg test score to </= 25 sec on LUE to demonstrate increased independence with fine motor tasks.  -     LTG 3 Patient to increase L 3-5 finger ROM to WFL for increased independence for functional tasks.  -     LTG 4 Pt. to increase L  strength to 60# to increase independence with functional tasks.  -     LTG 5 Pt/family to demo independence with splint wear to prevent contractures with L hand and wrist  -            Time Calculation    OT Goal Re-Cert Due Date 12/16/21  -           User Key  (r) = Recorded By, (t) = Taken By, (c) = Cosigned By    Initials Name Provider Type    Josephine Sr OTR Occupational Therapist                 OT Assessment/Plan     Row Name 11/16/21 0817          OT Assessment    Functional Limitations Limitation in home management; Limitations in community activities; Performance in work activities; Performance in self-care ADL  -     Impairments Coordination; Dexterity; Sensation; Range of motion; Muscle strength  -     Assessment Comments Pt seen today for OT eval after being admitted for LUE weakness with hx of cervical disc disease. Pt underwent left sided C7-T1 microdiscectomy on 10/21/2021. He has  been seen by HH OT and worked on some strengthening exercises but still continues with LUE weakness and coordination deficits distally. Pt works a desk job mostly on the computer and was cleared to work on 11/10/21, restrictions include only office work on the computer and no working >4 hrs/day. Pt presents to OP OT with decreased AROM of wrist and digits 3-5 in LUE. Pt with mild sensory deficits particularly on the dorsal/volar aspect of the 3rd digit on assessment, some discomfort with shape/dull assessment in digit tips. Pt able to distinguish 8/8 objects In L hand with eyes occluded. Noted he has had some shooting nerve pain in the LUE. He is performing ADLS at mod I level with some difficulty with buttons, zippers, ties, etc. but able to perform, he is also driving. He demo decreased fine and gross motor coordination as well as  and pinch strengths. Pt main concern is decreased digit extension. Discussed possible need for night splint and importance of hand positioning while awake. Trial today with NMES for digit extension, fair extension of 3rd digit and small amount of increased extension of 4-5. Discussed purchasing home NMES unit and use of NMES/Bioness while in OP OT. Neuro would like bioness stimulation for L arm weakness to aggressively strengthen his hand to prevent permanent weakness. Pt will benefit from OP OT to address LUE ROM, strength, sensory deficits, and coordination to increase his safety and independence with ADLS, IAs and functional grasp tasks.  -SG     OT Diagnosis L hand ROM, strength, coordiantion deficits  -SG     OT Rehab Potential Good  -SG     Patient/caregiver participated in establishment of treatment plan and goals Yes  -SG     Patient would benefit from skilled therapy intervention Yes  -SG            OT Plan    OT Frequency 2x/week  -SG     Predicted Duration of Therapy Intervention (OT) 2x/week for 12 weeks  -SG     Planned Therapy Interventions (Optional Details)  strengthening; ROM (Range of Motion); postural re-education; patient/family education; neuromuscular re-education; home exercise program; motor coordination training  -MATTY           User Key  (r) = Recorded By, (t) = Taken By, (c) = Cosigned By    Initials Name Provider Type    Josephine Sr OTR Occupational Therapist                       Outcome Measure Options: 9 Hole Peg, Box and Blocks, Other Outcome Measure  9 Hole Peg  9-Hole Peg Left: 36  9-Hole Peg Right: 19  Box and Blocks  Box and Blocks Left: 64  Box and Blocks Right: 79  Other Outcome Measure Tool Used  Other Outcome Measure Tool Comments: UEFI 39/80      Time Calculation:    OT Start Time: 0800  OT Stop Time: 0906  OT Time Calculation (min): 66 min  Total Timed Code Minutes- OT: 16 minute(s)  Timed Charges  93888 - OT Therapeutic Exercise Minutes: 16  Untimed Charges  OT Eval/Re-eval Minutes: 50  Total Minutes  Timed Charges Total Minutes: 16  Untimed Charges Total Minutes: 50   Total Minutes: 66     Therapy Charges for Today     Code Description Service Date Service Provider Modifiers Qty    64734223153 HC OT THER PROC EA 15 MIN 11/16/2021 Josephine Hemphill OTR GO 1    62154241752 HC OT EVAL MOD COMPLEXITY 3 11/16/2021 Josephine Hemphill OTR GO 1                  STEVEN Beatty  11/16/2021

## 2021-11-19 ENCOUNTER — HOSPITAL ENCOUNTER (OUTPATIENT)
Dept: OCCUPATIONAL THERAPY | Facility: HOSPITAL | Age: 56
Setting detail: THERAPIES SERIES
Discharge: HOME | End: 2021-11-19

## 2021-11-19 DIAGNOSIS — Z98.890 H/O MICRODISCECTOMY: Primary | ICD-10-CM

## 2021-11-19 DIAGNOSIS — R29.898 WEAKNESS OF LEFT HAND: ICD-10-CM

## 2021-11-19 PROCEDURE — 97110 THERAPEUTIC EXERCISES: CPT | Performed by: OCCUPATIONAL THERAPIST

## 2021-11-19 NOTE — THERAPY TREATMENT NOTE
Outpatient Occupational Therapy Ortho Treatment Note  [facility]     Patient Name: Orion Pop  : 1965  MRN: [id number]  Today's Date: 2021        Visit Date: 2021    Patient Active Problem List   Diagnosis   • Cervical disc disorder at C5-C6 level with radiculopathy   • Gout   • Hyperlipidemia   • Special screening, prostate cancer   • Annual physical exam   • Weakness of left upper extremity   • Herniation of cervical intervertebral disc with radiculopathy   • Cervical disc disease   • Weakness of left arm        Past Medical History:   Diagnosis Date   • Cervical disc disease    • Gout    • Hyperlipidemia    • Hypertension    • Neck pain         Past Surgical History:   Procedure Laterality Date   • APPENDECTOMY     • CERVICAL DISCECTOMY POSTERIOR FUSION WITH BRAIN LAB Left 10/21/2021    Procedure: LEFT CERVICAL 7 AND THORACIC 1 POSTERIOR DISCECTOMY WITH METRIX;  Surgeon: Lennox Bermudez MD;  Location: [facility];  Service: Neurosurgery;  Laterality: Left;   • TONSILLECTOMY     • VASECTOMY           Visit Dx:    ICD-10-CM ICD-9-CM   1. H/O microdiscectomy  Z98.890 V15.29   2. Weakness of left hand  R29.898 728.87                         OT Assessment/Plan     Row Name 21 1428          OT Assessment    Assessment Comments We attempted setting up bioness unit, very limited 4-5 finger extension DIP distally, pt more comfortable with NMES unit so set up for the rest of the session during his exercises. He has very weak intrisinc hand muscles as well as weakness in all thumb planes. Max difficulty with hook grasp pencil curl, unable to fully curl down into fist and extend into hook.  -SG           User Key  (r) = Recorded By, (t) = Taken By, (c) = Cosigned By    Initials Name Provider Type    Josephine Sr OTR Occupational Therapist                        OT Exercises     Row Name 21 1400             Precautions    Existing Precautions/Restrictions no known  precautions/restrictions  -SG              Exercise 1    Exercise Name 1 Standing  -SG      Cueing 1 Verbal; Demo  -SG      Equipment 1 UE Ergometer  -SG      Time (Minutes) 1 3  -SG              Exercise 2    Exercise Name 2 Horiz add/abd  -SG      Cueing 2 Verbal; Demo  -SG      Equipment 2 Theraband  -SG      Resistance 2 Red  -SG      Sets 2 3  -SG      Reps 2 10  -SG              Exercise 3    Exercise Name 3 Protraction/retraction  -SG      Cueing 3 Verbal; Demo  -SG      Equipment 3 Theraband  -SG      Resistance 3 Red  -SG      Sets 3 3  -SG      Reps 3 10  -SG              Exercise 4    Exercise Name 4 Elbow flex/ext  -SG      Cueing 4 Verbal; Demo  -SG      Equipment 4 Theraband  -SG      Resistance 4 Red  -SG      Sets 4 3  -SG      Reps 4 10  -SG              Exercise 5    Exercise Name 5 Wrist flexion  -SG      Cueing 5 Verbal; Demo  -SG      Equipment 5 Dumbell  -SG      Weights/Plates 5 2  -SG      Sets 5 3  -SG      Reps 5 10  -SG              Exercise 6    Exercise Name 6 Wrist extension  -SG      Cueing 6 Verbal; Demo  -SG      Equipment 6 Dumbell  -SG      Weights/Plates 6 2  -SG      Sets 6 3  -SG      Reps 6 10  -SG              Exercise 7    Exercise Name 7 Radial/ulnar deviation  -SG      Cueing 7 Verbal; Demo  -SG      Equipment 7 Dumbell  -SG      Sets 7 3  -SG      Reps 7 10  -SG              Exercise 8    Exercise Name 8 Pronation/supination  -SG      Cueing 8 Verbal; Tactile; Demo  -SG      Equipment 8 Dumbell  -SG      Weights/Plates 8 2  -SG      Sets 8 3  -SG      Reps 8 10  -SG              Exercise 9    Exercise Name 9 Finger add/abd flat on table  -SG      Cueing 9 Verbal; Demo; Tactile; Auditory  -SG      Sets 9 2  -SG      Reps 9 10  -SG              Exercise 10    Exercise Name 10 Attempt hook grasp pencil curl  -SG      Cueing 10 Verbal; Tactile; Demo; Auditory  -SG      Sets 10 2  -SG      Reps 10 10  -SG              Exercise 11    Exercise Name 11 Theraputty for gross grasp,  lateral pinch, 2pt pinch  -MATTY      Cueing 11 Verbal; Demo; Auditory; Tactile  -            User Key  (r) = Recorded By, (t) = Taken By, (c) = Cosigned By    Initials Name Provider Type    Josephine Sr OTR Occupational Therapist                              Time Calculation:   OT Start Time: 1300  OT Stop Time: 1351  OT Time Calculation (min): 51 min  Total Timed Code Minutes- OT: 51 minute(s)  Timed Charges  45857 - OT Therapeutic Exercise Minutes: 51  Total Minutes  Timed Charges Total Minutes: 51   Total Minutes: 51     Therapy Charges for Today     Code Description Service Date Service Provider Modifiers Qty    21645540966 HC OT THER PROC EA 15 MIN 11/19/2021 Josephine Hemphill OTR GO 3                    STEVEN Beatty  11/19/2021

## 2021-11-23 ENCOUNTER — HOSPITAL ENCOUNTER (OUTPATIENT)
Dept: OCCUPATIONAL THERAPY | Facility: HOSPITAL | Age: 56
Setting detail: THERAPIES SERIES
Discharge: HOME | End: 2021-11-23

## 2021-11-23 DIAGNOSIS — R29.898 WEAKNESS OF LEFT HAND: ICD-10-CM

## 2021-11-23 DIAGNOSIS — Z98.890 H/O MICRODISCECTOMY: Primary | ICD-10-CM

## 2021-11-23 PROCEDURE — 97110 THERAPEUTIC EXERCISES: CPT | Performed by: OCCUPATIONAL THERAPIST

## 2021-11-23 PROCEDURE — 97530 THERAPEUTIC ACTIVITIES: CPT | Performed by: OCCUPATIONAL THERAPIST

## 2021-11-23 NOTE — THERAPY TREATMENT NOTE
Outpatient Occupational Therapy Neuro Treatment Note  [facility]     Patient Name: Orion Pop  : 1965  MRN: [id number]  Today's Date: 2021       Visit Date: 2021    Patient Active Problem List   Diagnosis   • Cervical disc disorder at C5-C6 level with radiculopathy   • Gout   • Hyperlipidemia   • Special screening, prostate cancer   • Annual physical exam   • Weakness of left upper extremity   • Herniation of cervical intervertebral disc with radiculopathy   • Cervical disc disease   • Weakness of left arm        Past Medical History:   Diagnosis Date   • Cervical disc disease    • Gout    • Hyperlipidemia    • Hypertension    • Neck pain         Past Surgical History:   Procedure Laterality Date   • APPENDECTOMY     • CERVICAL DISCECTOMY POSTERIOR FUSION WITH BRAIN LAB Left 10/21/2021    Procedure: LEFT CERVICAL 7 AND THORACIC 1 POSTERIOR DISCECTOMY WITH METRIX;  Surgeon: Lennox Bermudez MD;  Location: [facility];  Service: Neurosurgery;  Laterality: Left;   • TONSILLECTOMY     • VASECTOMY           Visit Dx:    ICD-10-CM ICD-9-CM   1. H/O microdiscectomy  Z98.890 V15.29   2. Weakness of left hand  R29.898 728.87                    OT Assessment/Plan     Row Name 21 1235          OT Assessment    Assessment Comments Pt encouraged by his LUE improvement, reports being sore after his OT session last but feels like he is extending his fingers slightly more. We are getting him fitted for a splint next week to wear at night to prevent any contractures.  -SG           User Key  (r) = Recorded By, (t) = Taken By, (c) = Cosigned By    Initials Name Provider Type    Josephine Sr OTR Occupational Therapist                                 Modalities     Row Name 21 1100             ELECTRICAL STIMULATION    Attended/Unattended Attended  -SG      Max mAmp 22  -SG      Location/Electrode Placement/Other NMES to dorsal forearm for digit extension, no issues with skin integrity  "following removal of pads  -SG            User Key  (r) = Recorded By, (t) = Taken By, (c) = Cosigned By    Initials Name Provider Type    Josephine Sr OTR Occupational Therapist               OT Exercises     Row Name 11/23/21 0800             Subjective Comments    Subjective Comments \"I'm having trouble putting my coat on because my L arm is weak.\"  -SG              Exercise 1    Exercise Name 1 Standing  -SG      Cueing 1 Verbal; Demo  -SG      Equipment 1 UE Ergometer  -SG      Time (Minutes) 1 3  -SG              Exercise 2    Exercise Name 2 Standing shoulder extension  -SG      Cueing 2 Verbal; Demo  -SG      Equipment 2 Theraband  -SG      Resistance 2 Red  -SG      Sets 2 3  -SG      Reps 2 10  -SG              Exercise 3    Exercise Name 3 Standing tricep extension  -SG      Cueing 3 Verbal; Demo  -SG      Equipment 3 Theraband  -SG      Resistance 3 Yellow  -SG      Sets 3 3  -SG      Reps 3 10  -SG              Exercise 4    Exercise Name 4 Standing LUE IR/ER  -SG      Cueing 4 Verbal; Demo  -SG      Equipment 4 Theraband  -SG      Resistance 4 Yellow  -SG      Sets 4 3  -SG      Reps 4 10  -SG              Exercise 5    Exercise Name 5 LUE nuts and bolts task, mild diffiuclty rethreading nut  -SG      Cueing 5 Verbal; Demo  -SG      Reps 5 8  -SG              Exercise 6    Exercise Name 6 LUE weighted reaching task with focus on grasping ring with pads of all fingers on outside of ring  -SG      Cueing 6 Verbal; Demo  -SG      Equipment 6 Cuff Weight  -SG      Weights/Plates 6 --  1.5  -SG      Sets 6 2  -SG      Reps 6 --  24  -SG            User Key  (r) = Recorded By, (t) = Taken By, (c) = Cosigned By    Initials Name Provider Type    Josephine Sr OTR Occupational Therapist                            Time Calculation:   OT Start Time: 0804  OT Stop Time: 0845  OT Time Calculation (min): 41 min  Total Timed Code Minutes- OT: 41 minute(s)  Timed Charges  37869 - OT Therapeutic Exercise " Minutes: 26  04175 - OT Therapeutic Activity Minutes: 15  Total Minutes  Timed Charges Total Minutes: 41   Total Minutes: 41     Therapy Charges for Today     Code Description Service Date Service Provider Modifiers Qty    35338623828  OT THER PROC EA 15 MIN 11/23/2021 Josephine Hemphill OTR GO 2    40349569494  OT THERAPEUTIC ACT EA 15 MIN 11/23/2021 Josephine Hemphill OTR GO 1                    STEVEN Beatty  11/23/2021

## 2021-11-26 ENCOUNTER — APPOINTMENT (OUTPATIENT)
Dept: OCCUPATIONAL THERAPY | Facility: HOSPITAL | Age: 56
End: 2021-11-26

## 2021-11-29 ENCOUNTER — TELEPHONE (OUTPATIENT)
Dept: NEUROSURGERY | Facility: CLINIC | Age: 56
End: 2021-11-29

## 2021-11-30 ENCOUNTER — HOSPITAL ENCOUNTER (OUTPATIENT)
Dept: OCCUPATIONAL THERAPY | Facility: HOSPITAL | Age: 56
Setting detail: THERAPIES SERIES
Discharge: HOME | End: 2021-11-30

## 2021-11-30 PROCEDURE — 97530 THERAPEUTIC ACTIVITIES: CPT | Performed by: OCCUPATIONAL THERAPIST

## 2021-11-30 PROCEDURE — 97110 THERAPEUTIC EXERCISES: CPT | Performed by: OCCUPATIONAL THERAPIST

## 2021-12-01 NOTE — PROGRESS NOTES
"Subjective   Patient ID: Orion Pop is a 56 y.o. male is here today for follow-up for neck pain. This will be he second post-operative visit status post left C7/T1 posterior discectomy with metrx on 10/21/21.  At last visit, 11/2,  pain and numbness had improved, but he was still experiencing some difficulty with left arm weakness and dexterity issues.  He was referred to OT.     History of Present Illness  Today Mr. Pop reports improvement of strength left hand and left arm. No arm or neck pain. He continues to work with OT.  He received a specialized hand splint on Tuesday to be worn at night. He feels that use of hand/fingers already improving with splint. He denies any neck pain. He reports left hand, 4 fingers, and lower part of left posterior arm are still numb with tingling. Therapy is using E-stim. He is working part time. Non-physical job.    The following portions of the patient's history were reviewed and updated as appropriate: allergies, current medications and problem list.    Review of Systems   Constitutional: Negative for chills and fever.   Cardiovascular: Negative for leg swelling.   Genitourinary: Negative for enuresis.   Musculoskeletal: Negative for neck pain.   Skin: Negative for wound.   Neurological: Positive for weakness and numbness.       Objective     Vitals:    12/03/21 1115   BP: 138/94   Pulse: 75   Temp: 98 °F (36.7 °C)   SpO2: 97%   Weight: 98.9 kg (218 lb 0.6 oz)   Height: 182.9 cm (72\")     Body mass index is 29.57 kg/m².      Physical Exam  Vitals reviewed.   Constitutional:       Appearance: Normal appearance.   Neck:      Comments: Posterior cervical incision well-healed.  Small retained stitch removed without difficulty.  Musculoskeletal:      Cervical back: Normal range of motion and neck supple.   Skin:     General: Skin is warm and dry.   Neurological:      General: No focal deficit present.      Mental Status: He is alert.      Gait: Gait is intact.   Psychiatric:    "      Mood and Affect: Mood normal.         Speech: Speech normal.         Thought Content: Thought content normal.       Neurologic Exam     Mental Status   Speech: speech is normal   Level of consciousness: alert  Knowledge: good.   Normal comprehension.     Motor Exam   Muscle bulk: decreased (Left tricep)    Strength   Right deltoid: 5/5  Left deltoid: 5/5  Right biceps: 5/5  Left biceps: 5/5  Right triceps: 5/5  Left triceps: 3/5  Right wrist flexion: 5/5  Left wrist flexion: 5/5  Right wrist extension: 5/5  Left wrist extension: 5/5  Right interossei: 5/5  Left interossei: 4/5  Left  4+/5     Sensory Exam   Right arm light touch: normal  Left arm light touch: Diminished in all fingers as well as posterior lateral aspect of forearm.    Gait, Coordination, and Reflexes     Gait  Gait: normal      Assessment/Plan   Independent Review of Radiographic Studies:      I personally reviewed the images from the following studies.    No new imaging    Medical Decision Making:      Patient presents for second postoperative visit after 1 level posterior cervical decompression/discectomy.  He had significant left arm weakness prior to surgery.  This is improving with occupational therapy.  Recently received a hand splint that he wears at night and he has noticed a quite a bit of improvement in his dexterity and positioning of his fingers.  He is pleased with his progress and strength.  He denies any significant pain.  Tolerating part-time work and is hoping to go back full-time.  He has a nonphysical job.  On exam his incision is well-healed.  There is a small retained stitch at the inferior portion that was removed easily.  Strength is notably improved in his  and interosseous.  Triceps weakness still persists.  He states they are working on that as well in therapy.  We discussed activity restrictions at this point, he can lift push pull 25 pounds or less but should not lift overhead more than 15 pounds.  He can  return to work full-time.  He should continue occupational therapy.  We will see him back in 2 months for clinical check.    Diagnoses and all orders for this visit:    1. Postop check (Primary)    2. Weakness of left upper extremity      Return in about 2 months (around 2/3/2022) for Follow-up with Dr. Bermudez after further OT.

## 2021-12-03 ENCOUNTER — OFFICE VISIT (OUTPATIENT)
Dept: NEUROSURGERY | Facility: CLINIC | Age: 56
End: 2021-12-03

## 2021-12-03 ENCOUNTER — HOSPITAL ENCOUNTER (OUTPATIENT)
Dept: OCCUPATIONAL THERAPY | Facility: HOSPITAL | Age: 56
Setting detail: THERAPIES SERIES
Discharge: HOME | End: 2021-12-03

## 2021-12-03 VITALS
SYSTOLIC BLOOD PRESSURE: 138 MMHG | DIASTOLIC BLOOD PRESSURE: 94 MMHG | OXYGEN SATURATION: 97 % | TEMPERATURE: 98 F | WEIGHT: 218.03 LBS | BODY MASS INDEX: 29.53 KG/M2 | HEART RATE: 75 BPM | HEIGHT: 72 IN

## 2021-12-03 DIAGNOSIS — Z98.890 H/O MICRODISCECTOMY: Primary | ICD-10-CM

## 2021-12-03 DIAGNOSIS — R29.898 WEAKNESS OF LEFT HAND: ICD-10-CM

## 2021-12-03 DIAGNOSIS — Z09 POSTOP CHECK: Primary | ICD-10-CM

## 2021-12-03 DIAGNOSIS — R29.898 WEAKNESS OF LEFT UPPER EXTREMITY: ICD-10-CM

## 2021-12-03 PROCEDURE — 97530 THERAPEUTIC ACTIVITIES: CPT | Performed by: OCCUPATIONAL THERAPIST

## 2021-12-03 PROCEDURE — 97110 THERAPEUTIC EXERCISES: CPT | Performed by: OCCUPATIONAL THERAPIST

## 2021-12-03 PROCEDURE — 99024 POSTOP FOLLOW-UP VISIT: CPT | Performed by: NURSE PRACTITIONER

## 2021-12-03 NOTE — THERAPY TREATMENT NOTE
"Outpatient Occupational Therapy Neuro Treatment Note  [facility]     Patient Name: Orion Pop  : 1965  MRN: [id number]  Today's Date: 12/3/2021       Visit Date: 2021    Patient Active Problem List   Diagnosis   • Cervical disc disorder at C5-C6 level with radiculopathy   • Gout   • Hyperlipidemia   • Special screening, prostate cancer   • Annual physical exam   • Weakness of left upper extremity   • Herniation of cervical intervertebral disc with radiculopathy   • Cervical disc disease        Past Medical History:   Diagnosis Date   • Cervical disc disease    • Gout    • Hyperlipidemia    • Hypertension    • Neck pain         Past Surgical History:   Procedure Laterality Date   • APPENDECTOMY     • CERVICAL DISCECTOMY POSTERIOR FUSION WITH BRAIN LAB Left 10/21/2021    Procedure: LEFT CERVICAL 7 AND THORACIC 1 POSTERIOR DISCECTOMY WITH METRIX;  Surgeon: Lennox Bermudez MD;  Location: [facility] MAIN OR;  Service: Neurosurgery;  Laterality: Left;   • TONSILLECTOMY     • VASECTOMY           Visit Dx:    ICD-10-CM ICD-9-CM   1. H/O microdiscectomy  Z98.890 V15.29   2. Weakness of left hand  R29.898 728.87                    OT Assessment/Plan     Row Name 21 1507          OT Assessment    Assessment Comments Pt initially demo signifianctly improved finger extension, very pleased with use of resting hand splint. Able to tolerate 3# DB for most exericses today with less fatigue than previous session. Still unable to abduct pinky on tabletop.  -SG           User Key  (r) = Recorded By, (t) = Taken By, (c) = Cosigned By    Initials Name Provider Type    Josephine Sr OTR Occupational Therapist                                   OT Exercises     Row Name 21 1300             Subjective Comments    Subjective Comments \"The splint is alredy working, my hand is so much straighter.\"  -SG              Exercise 1    Exercise Name 1 LUE shoulder press  -SG      Cueing 1 Verbal; Demo  -SG      " Equipment 1 Dumbell  -SG      Weights/Plates 1 3  -SG      Sets 1 3  -SG      Reps 1 10  -SG              Exercise 2    Exercise Name 2 Front raise  -SG      Cueing 2 Verbal; Demo  -SG      Equipment 2 Dumbell  -SG      Weights/Plates 2 3  -SG      Sets 2 3  -SG      Reps 2 10  -SG              Exercise 3    Exercise Name 3 Lateral raise  -SG      Cueing 3 Verbal; Demo  -SG      Equipment 3 Dumbell  -SG      Weights/Plates 3 3  -SG      Sets 3 3  -SG      Reps 3 10  -SG              Exercise 4    Exercise Name 4 Ulnar nerve glides and flossing  -SG      Cueing 4 Verbal; Tactile; Demo  -SG              Exercise 5    Exercise Name 5 Finger strengthening tasks with large and small clothespins, encouraged use of pinching with 4th and 5th digits  -SG      Cueing 5 Verbal; Demo  -SG              Exercise 6    Exercise Name 6 Priscilla trapping L hand, picks up 5 coins, compensates in supination  -SG      Cueing 6 Verbal; Demo  -SG              Exercise 7    Exercise Name 7 Simultaneous joint blocking with OT holding pen  -SG      Cueing 7 Verbal; Demo  -SG      Sets 7 3  -SG      Reps 7 5  -SG            User Key  (r) = Recorded By, (t) = Taken By, (c) = Cosigned By    Initials Name Provider Type    SG Josephine Hemphill OTR Occupational Therapist                            Time Calculation:   OT Start Time: 1301  OT Stop Time: 1347  OT Time Calculation (min): 46 min  Total Timed Code Minutes- OT: 46 minute(s)  Timed Charges  76381 - OT Therapeutic Exercise Minutes: 18  37513 - OT Therapeutic Activity Minutes: 28  Total Minutes  Timed Charges Total Minutes: 46   Total Minutes: 46     Therapy Charges for Today     Code Description Service Date Service Provider Modifiers Qty    16589073144 HC OT THER PROC EA 15 MIN 12/3/2021 Josephine Hemphill OTR GO 1    93817489711 HC OT THERAPEUTIC ACT EA 15 MIN 12/3/2021 Josephine Hemphill OTR GO 2                    STEVEN Beatty  12/3/2021

## 2021-12-07 ENCOUNTER — HOSPITAL ENCOUNTER (OUTPATIENT)
Dept: OCCUPATIONAL THERAPY | Facility: HOSPITAL | Age: 56
Setting detail: THERAPIES SERIES
Discharge: HOME | End: 2021-12-07

## 2021-12-07 DIAGNOSIS — R29.898 WEAKNESS OF LEFT HAND: ICD-10-CM

## 2021-12-07 DIAGNOSIS — Z98.890 H/O MICRODISCECTOMY: Primary | ICD-10-CM

## 2021-12-07 PROCEDURE — 97110 THERAPEUTIC EXERCISES: CPT | Performed by: OCCUPATIONAL THERAPIST

## 2021-12-07 PROCEDURE — 97530 THERAPEUTIC ACTIVITIES: CPT | Performed by: OCCUPATIONAL THERAPIST

## 2021-12-07 NOTE — THERAPY TREATMENT NOTE
"Outpatient Occupational Therapy Neuro Treatment Note  [facility]     Patient Name: Orion Pop  : 1965  MRN: [id number]  Today's Date: 2021       Visit Date: 2021    Patient Active Problem List   Diagnosis   • Cervical disc disorder at C5-C6 level with radiculopathy   • Gout   • Hyperlipidemia   • Special screening, prostate cancer   • Annual physical exam   • Weakness of left upper extremity   • Herniation of cervical intervertebral disc with radiculopathy   • Cervical disc disease        Past Medical History:   Diagnosis Date   • Cervical disc disease    • Gout    • Hyperlipidemia    • Hypertension    • Neck pain         Past Surgical History:   Procedure Laterality Date   • APPENDECTOMY     • CERVICAL DISCECTOMY POSTERIOR FUSION WITH BRAIN LAB Left 10/21/2021    Procedure: LEFT CERVICAL 7 AND THORACIC 1 POSTERIOR DISCECTOMY WITH METRIX;  Surgeon: Lennox Bermudez MD;  Location: [facility];  Service: Neurosurgery;  Laterality: Left;   • TONSILLECTOMY     • VASECTOMY           Visit Dx:    ICD-10-CM ICD-9-CM   1. H/O microdiscectomy  Z98.890 V15.29   2. Weakness of left hand  R29.898 728.87                    OT Assessment/Plan     Row Name 21 0819          OT Assessment    Assessment Comments Pt reports continuing coin counting tasks at home and still finds it fairly difficult to translate from palm to fingertips. He reports being fairly fatigued after his 8 hour work day yesterday and noted that his hand draws in more when he is tired. Pt does fair with endurance tasks today, needs rest break after 3 min on the arm bike to continue for 2 more minutes.  -SG           User Key  (r) = Recorded By, (t) = Taken By, (c) = Cosigned By    Initials Name Provider Type    Josephine Sr OTR Occupational Therapist                                   OT Exercises     Row Name 21 0800             Subjective Comments    Subjective Comments \"I had some arm pain yesterday and after I " "did my exercises it felt much better.\" \"Yesterday was my first full day back to work.\"  -SG              Subjective Pain    Able to rate subjective pain? yes  -SG      Pre-Treatment Pain Level 0  -SG              Exercise 1    Exercise Name 1 Standing  -SG      Cueing 1 Verbal; Demo  -SG      Equipment 1 UE Ergometer  -SG      Time (Minutes) 1 3, 2  -SG              Exercise 2    Exercise Name 2 Wrist maze  -SG      Cueing 2 Verbal; Demo  -SG      Reps 2 2  -SG      Time (Minutes) 2 2  -SG              Exercise 3    Exercise Name 3 Manipulation of clothespins to place onto verticle dowel, mild fatigue with 2pt pinch  -SG      Cueing 3 Verbal; Demo  -SG      Reps 3 20  -SG              Exercise 4    Exercise Name 4 Fine motor task placing 1/4\" pegs into pegboard with L hand; initially difficulty using L hand to manipulate and turn pegs  -SG      Cueing 4 Verbal; Demo  -SG              Exercise 5    Exercise Name 5  strengthening task using moderate resistive hand gripper to grasp and remove pegs from pegboard with LUE  -SG      Cueing 5 Verbal; Demo  -SG            User Key  (r) = Recorded By, (t) = Taken By, (c) = Cosigned By    Initials Name Provider Type    SG Josephine Hemphill OTR Occupational Therapist                            Time Calculation:   OT Start Time: 0800  OT Stop Time: 0845  OT Time Calculation (min): 45 min  Total Timed Code Minutes- OT: 45 minute(s)  Timed Charges  24339 - OT Therapeutic Exercise Minutes: 15  24121 - OT Therapeutic Activity Minutes: 30  Total Minutes  Timed Charges Total Minutes: 45   Total Minutes: 45     Therapy Charges for Today     Code Description Service Date Service Provider Modifiers Qty    75835643776 HC OT THER PROC EA 15 MIN 12/7/2021 Josephine Hemphill OTR GO 1    88885330319 HC OT THERAPEUTIC ACT EA 15 MIN 12/7/2021 Josephine Hemphill OTR GO 2                    STEVEN Beatty  12/7/2021  "

## 2021-12-10 ENCOUNTER — HOSPITAL ENCOUNTER (OUTPATIENT)
Dept: OCCUPATIONAL THERAPY | Facility: HOSPITAL | Age: 56
Setting detail: THERAPIES SERIES
Discharge: HOME | End: 2021-12-10

## 2021-12-10 DIAGNOSIS — Z98.890 H/O MICRODISCECTOMY: Primary | ICD-10-CM

## 2021-12-10 DIAGNOSIS — R29.898 WEAKNESS OF LEFT HAND: ICD-10-CM

## 2021-12-10 PROCEDURE — 97530 THERAPEUTIC ACTIVITIES: CPT

## 2021-12-10 PROCEDURE — 97110 THERAPEUTIC EXERCISES: CPT

## 2021-12-10 NOTE — THERAPY TREATMENT NOTE
"Outpatient Occupational Therapy Neuro Treatment Note  [facility]     Patient Name: Orion Pop  : 1965  MRN: [id number]  Today's Date: 12/10/2021       Visit Date: 12/10/2021    Patient Active Problem List   Diagnosis   • Cervical disc disorder at C5-C6 level with radiculopathy   • Gout   • Hyperlipidemia   • Special screening, prostate cancer   • Annual physical exam   • Weakness of left upper extremity   • Herniation of cervical intervertebral disc with radiculopathy   • Cervical disc disease        Past Medical History:   Diagnosis Date   • Cervical disc disease    • Gout    • Hyperlipidemia    • Hypertension    • Neck pain         Past Surgical History:   Procedure Laterality Date   • APPENDECTOMY     • CERVICAL DISCECTOMY POSTERIOR FUSION WITH BRAIN LAB Left 10/21/2021    Procedure: LEFT CERVICAL 7 AND THORACIC 1 POSTERIOR DISCECTOMY WITH METRIX;  Surgeon: Lennox Bermudez MD;  Location: [facility];  Service: Neurosurgery;  Laterality: Left;   • TONSILLECTOMY     • VASECTOMY           Visit Dx:    ICD-10-CM ICD-9-CM   1. H/O microdiscectomy  Z98.890 V15.29   2. Weakness of left hand  R29.898 728.87                    OT Assessment/Plan     Row Name 12/10/21 1446          OT Assessment    Assessment Comments Pt reports he completes his HEP each day at home and can see good improvement so far. Pt continues to demo decreased strength in UEs, decreased FMC w/ imipaired ability to isolate digits, all of which is impacting his indep w/ ADL/IADLs. Pt w/ great motivation.  -RD           User Key  (r) = Recorded By, (t) = Taken By, (c) = Cosigned By    Initials Name Provider Type    Hyun Guzman, OT Occupational Therapist                                   OT Exercises     Row Name 12/10/21 1300             Subjective Comments    Subjective Comments \"This past weekend I raked leaves and then put up the nafisa tree. I was very tired the next day and didn't want to do my exercises, but I did " "them and my arm and hand actually felt better afterwards\"  -RD              Subjective Pain    Able to rate subjective pain? yes  -RD      Pre-Treatment Pain Level 0  -RD              Total Minutes    11574 - OT Therapeutic Exercise Minutes 10  -RD      87828 - OT Therapeutic Activity Minutes 34  -RD              Exercise 1    Exercise Name 1 Standing  -RD      Cueing 1 Verbal; Demo  -RD      Equipment 1 UE Ergometer  -RD      Time (Minutes) 1 5  -RD              Exercise 2    Exercise Name 2 Fine motor task using 1inch pegs to don and doff into resistive pegboard w/ focus on utilizing 1st and 4th/5th digits- min difficulty noted w/ mild fatigue  -RD              Exercise 3    Exercise Name 3 Manipulation of clothespins to place onto horizontal dowels using 1st and 4th digits only- mild fatigue  -RD      Cueing 3 Verbal; Demo  -RD      Reps 3 25  -RD              Exercise 4    Exercise Name 4 Fine motor task placing 1/4\" pegs into pegboard with L hand using 1st and 4th digits; pt then doffs pegs using 1st adn 5th digits only; moderate difficulty w/ task. Pt also has difficulty isolating 4th adn 5th digits  -RD      Cueing 4 Verbal; Demo  -RD            User Key  (r) = Recorded By, (t) = Taken By, (c) = Cosigned By    Initials Name Provider Type    RD Hyun Renteria OT Occupational Therapist                            Time Calculation:   OT Start Time: 1300  OT Stop Time: 1344  OT Time Calculation (min): 44 min  Total Timed Code Minutes- OT: 45 minute(s)  Timed Charges  65449 - OT Therapeutic Exercise Minutes: 10  67555 - OT Therapeutic Activity Minutes: 34  Total Minutes  Timed Charges Total Minutes: 44   Total Minutes: 44     Therapy Charges for Today     Code Description Service Date Service Provider Modifiers Qty    68124452405 HC OT THER PROC EA 15 MIN 12/10/2021 Hyun Renteria OT GO 1    42530271452 HC OT THERAPEUTIC ACT EA 15 MIN 12/10/2021 Hyun Renteria OT GO 2                    Hyun SALDIVAR " [place], OT  12/10/2021

## 2021-12-14 ENCOUNTER — HOSPITAL ENCOUNTER (OUTPATIENT)
Dept: OCCUPATIONAL THERAPY | Facility: HOSPITAL | Age: 56
Setting detail: THERAPIES SERIES
Discharge: HOME | End: 2021-12-14

## 2021-12-14 DIAGNOSIS — R29.898 WEAKNESS OF LEFT HAND: ICD-10-CM

## 2021-12-14 DIAGNOSIS — Z98.890 H/O MICRODISCECTOMY: Primary | ICD-10-CM

## 2021-12-14 PROCEDURE — 97530 THERAPEUTIC ACTIVITIES: CPT | Performed by: OCCUPATIONAL THERAPIST

## 2021-12-14 PROCEDURE — 97110 THERAPEUTIC EXERCISES: CPT | Performed by: OCCUPATIONAL THERAPIST

## 2021-12-14 NOTE — THERAPY PROGRESS REPORT/RE-CERT
Outpatient Occupational Therapy Neuro Re-Assessment  [facility]     Patient Name: Orion Pop  : 1965  MRN: [id number]  Today's Date: 2021      Visit Date: 2021    Patient Active Problem List   Diagnosis   • Cervical disc disorder at C5-C6 level with radiculopathy   • Gout   • Hyperlipidemia   • Special screening, prostate cancer   • Annual physical exam   • Weakness of left upper extremity   • Herniation of cervical intervertebral disc with radiculopathy   • Cervical disc disease        Past Medical History:   Diagnosis Date   • Cervical disc disease    • Gout    • Hyperlipidemia    • Hypertension    • Neck pain         Past Surgical History:   Procedure Laterality Date   • APPENDECTOMY     • CERVICAL DISCECTOMY POSTERIOR FUSION WITH BRAIN LAB Left 10/21/2021    Procedure: LEFT CERVICAL 7 AND THORACIC 1 POSTERIOR DISCECTOMY WITH METRIX;  Surgeon: Lennox Bermudez MD;  Location: [facility];  Service: Neurosurgery;  Laterality: Left;   • TONSILLECTOMY     • VASECTOMY           Visit Dx:      ICD-10-CM ICD-9-CM   1. H/O microdiscectomy  Z98.890 V15.29   2. Weakness of left hand  R29.898 728.87               Hand Therapy (last 24 hours)     Hand Eval     Row Name 21 0800              Strength Right    # Reps 1  -SG      Right Rung 2  -SG      Right  Test 1 91  -SG       Strength Average Right 91  -SG               Strength Left    # Reps 3  -SG      Left Rung 2  -SG      Left  Test 1 50  -SG      Left  Test 2 54  -SG      Left  Test 3 55  -SG       Strength Average Left 53  -SG              Left Hand Strength - Pinch (lbs)    Lateral 9 lbs  -SG      Tip (2 point) 10 lbs  -SG            User Key  (r) = Recorded By, (t) = Taken By, (c) = Cosigned By    Initials Name Provider Type    Josephine Sr OTR Occupational Therapist                    Therapy Education  Education Details: Discussed ergonomically correct posture for working at his desk to  assist with posture and neck discomfort  Given: Posture/body mechanics  Program: New  How Provided: Verbal, Demonstration  Provided to: Patient  Level of Understanding: Verbalized, Demonstrated     OT Goals     Row Name 12/14/21 1100          OT Short Term Goals    STG Date to Achieve 01/11/22  -     STG 1 Patient to be independent with fine motor coordination home program.  -     STG 1 Progress Ongoing  -     STG 2 Patient to improve 9 hole peg test score to </= 30 sec on LUE to demonstrate increased independence with fine motor tasks.  -SG     STG 2 Progress Met  -     STG 3 Pt. and family to be independent with UE ROM HEP.  -SG     STG 3 Progress Met  -     STG 4 Pt. to increase L  strength to 50# to increase independence with functional tasks.  -     STG 4 Progress Met  -     STG 5 Pt to be instructed on home NMES unit for LUE to assist in improving ROM and strength and to prevent contracture deformity in LUE.  -     STG 5 Progress Met  -            Long Term Goals    LTG Date to Achieve 02/08/22  -     LTG 1 Pt. to improve Box and Blocks score on LUE to 70 blocks on affected side to demonstrate increased independence with daily tasks.  -     LTG 1 Progress Ongoing  -     LTG 2 Patient to improve 9 hole peg test score to </= 25 sec on LUE to demonstrate increased independence with fine motor tasks.  -     LTG 2 Progress Met  -     LTG 3 Patient to increase L 3-5 finger ROM to WFL for increased independence for functional tasks.  -     LTG 3 Progress Ongoing  -     LTG 4 Pt. to increase L  strength to 60# to increase independence with functional tasks.  -     LTG 4 Progress Ongoing  -     LTG 5 Pt/family to demo independence with splint wear to prevent contractures with L hand and wrist  -SG     LTG 5 Progress Met  -            Time Calculation    OT Goal Re-Cert Due Date 01/13/22  -           User Key  (r) = Recorded By, (t) = Taken By, (c) = Cosigned By     Initials Name Provider Type    Josephine Sr OTR Occupational Therapist                        OT Assessment/Plan     Row Name 12/14/21 1524 12/14/21 0817       OT Assessment    Functional Limitations Limitation in home management; Limitations in community activities; Performance in work activities; Performance in self-care ADL  -SG --    Impairments Coordination; Dexterity; Sensation; Range of motion; Muscle strength  -SG --    Assessment Comments Pt seen today for OT re-evaluation following C7-T1 microdiscectomy. Pt has made significant progress throughout OP OT and has returned to work full time. He reports that tying his shoes has become easier as well as working zippers and buttoning his pants. He does report at toileting tasks are still frustrating and difficult due to not having the strength to perform the tasks. He reports to having some neck pain and discomfort and we discussed attempting to improve his posture while sitting at work and his UE support as well as continuing his neck exercises previously given to him by PT. He has made improvement 8# increase in L hand  strength, a 5# lateral pinch increase and increased his 2pt pinch of the L hand by 2#. He has also decreased his fine motor 9 hole peg score by 13 seconds. Pt will continue to benefit from OP OT to improve LUE function for grasp tasks as well as ADLs/IADLs.  -SG --  -    OT Rehab Potential Good  -SG --    Patient/caregiver participated in establishment of treatment plan and goals Yes  -SG --    Patient would benefit from skilled therapy intervention Yes  -SG --       OT Plan    OT Frequency 2x/week  -SG --    Predicted Duration of Therapy Intervention (OT) 2x/week for 8 weeks  -SG --    Planned Therapy Interventions (Optional Details) strengthening; ROM (Range of Motion); postural re-education; patient/family education; neuromuscular re-education; home exercise program; motor coordination training  -SG --          User Key  (r) =  "Recorded By, (t) = Taken By, (c) = Cosigned By    Initials Name Provider Type    Josephine Sr OTR Occupational Therapist               OT Exercises     Row Name 12/14/21 1100             Subjective Comments    Subjective Comments \"I feel like my neck has been more sore lately.\"  -SG              Exercise 1    Exercise Name 1 Standing shoulder extension  -SG      Cueing 1 Verbal; Demo  -SG      Equipment 1 Theraband  -SG      Resistance 1 Red  -SG      Sets 1 3  -SG      Reps 1 10  -SG              Exercise 2    Exercise Name 2 Seated scapular retraction/row  -SG      Cueing 2 Verbal; Demo  -SG      Equipment 2 Theraband  -SG      Resistance 2 Red  -SG      Sets 2 3  -SG      Reps 2 10  -SG              Exercise 3    Exercise Name 3 Seated reverse fly  -SG      Cueing 3 Verbal; Demo  -SG      Equipment 3 Theraband  -SG      Resistance 3 Red  -SG      Sets 3 3  -SG      Reps 3 10  -SG              Exercise 4    Cueing 4 Verbal  -SG      Equipment 4 UE Ergometer  -SG      Time (Minutes) 14 3  -SG            User Key  (r) = Recorded By, (t) = Taken By, (c) = Cosigned By    Initials Name Provider Type    Josephine Sr OTR Occupational Therapist                  9 Hole Peg  9-Hole Peg Left: 23  Box and Blocks  Box and Blocks Left: 60  Other Outcome Measure Tool Used  Other Outcome Measure Tool Comments: KIRA 61/80      Time Calculation:   OT Start Time: 0801  OT Stop Time: 0845  OT Time Calculation (min): 44 min  Total Timed Code Minutes- OT: 44 minute(s)  Timed Charges  90423 - OT Therapeutic Exercise Minutes: 25  74588 - OT Therapeutic Activity Minutes: 19  Total Minutes  Timed Charges Total Minutes: 44   Total Minutes: 44     Therapy Charges for Today     Code Description Service Date Service Provider Modifiers Qty    41604089193 HC OT THER PROC EA 15 MIN 12/14/2021 Josephine Hemphill OTR GO 2    66715414134 HC OT THERAPEUTIC ACT EA 15 MIN 12/14/2021 Josephine Hemphill OTR GO 1                     Josephine " Kanchan, OTR  12/14/2021

## 2021-12-17 ENCOUNTER — HOSPITAL ENCOUNTER (OUTPATIENT)
Dept: OCCUPATIONAL THERAPY | Facility: HOSPITAL | Age: 56
Setting detail: THERAPIES SERIES
Discharge: HOME | End: 2021-12-17

## 2021-12-17 DIAGNOSIS — Z98.890 H/O MICRODISCECTOMY: Primary | ICD-10-CM

## 2021-12-17 DIAGNOSIS — R29.898 WEAKNESS OF LEFT HAND: ICD-10-CM

## 2021-12-17 PROCEDURE — 97110 THERAPEUTIC EXERCISES: CPT | Performed by: OCCUPATIONAL THERAPIST

## 2021-12-17 PROCEDURE — 97530 THERAPEUTIC ACTIVITIES: CPT | Performed by: OCCUPATIONAL THERAPIST

## 2021-12-17 NOTE — THERAPY TREATMENT NOTE
Outpatient Occupational Therapy Neuro Treatment Note  [facility]     Patient Name: Orion Pop  : 1965  MRN: [id number]  Today's Date: 2021       Visit Date: 2021    Patient Active Problem List   Diagnosis   • Cervical disc disorder at C5-C6 level with radiculopathy   • Gout   • Hyperlipidemia   • Special screening, prostate cancer   • Annual physical exam   • Weakness of left upper extremity   • Herniation of cervical intervertebral disc with radiculopathy   • Cervical disc disease        Past Medical History:   Diagnosis Date   • Cervical disc disease    • Gout    • Hyperlipidemia    • Hypertension    • Neck pain         Past Surgical History:   Procedure Laterality Date   • APPENDECTOMY     • CERVICAL DISCECTOMY POSTERIOR FUSION WITH BRAIN LAB Left 10/21/2021    Procedure: LEFT CERVICAL 7 AND THORACIC 1 POSTERIOR DISCECTOMY WITH METRIX;  Surgeon: Lennox Bermudez MD;  Location: [facility];  Service: Neurosurgery;  Laterality: Left;   • TONSILLECTOMY     • VASECTOMY           Visit Dx:    ICD-10-CM ICD-9-CM   1. H/O microdiscectomy  Z98.890 V15.29   2. Weakness of left hand  R29.898 728.87                    OT Assessment/Plan     Row Name 21 1529          OT Assessment    Assessment Comments Pt does very well today with small fine motor tasks, showing overall improvement in pinch strength as well as manipulation of objects. Pt able to  5 small perler beads in hand before dropping. Pt continues to benefit from OT to address the LUE.  -SG           User Key  (r) = Recorded By, (t) = Taken By, (c) = Cosigned By    Initials Name Provider Type    Josephine Sr OTR Occupational Therapist                           Therapy Education  Education Details: Radial nerve tensioner.  Given: Symptoms/condition management  Program: New  How Provided: Verbal, Demonstration, Written  Provided to: Patient  Level of Understanding: Verbalized, Demonstrated       OT Exercises     Row  "Name 12/17/21 1300             Subjective Comments    Subjective Comments \"The only numbness now is in the tips of my index finger and middle finger.\" \"The splint helps a lot.\"  -SG              Subjective Pain    Able to rate subjective pain? yes  -SG      Pre-Treatment Pain Level 0  -SG              Exercise 1    Exercise Name 1 Pt progresses through several radial nerve exercises, only feels stretch on forearms crossed/overhead reach exercises  -SG      Cueing 1 Verbal; Demo  -SG              Exercise 2    Exercise Name 2 2pt pinch task placing small resistive clothespins around circular form. Difficulty with even pinch to prevent from breaking small clothespins.  -SG      Cueing 2 Verbal; Demo  -SG              Exercise 3    Exercise Name 3 Pt places small perler beads onto slots with L hand using 2pt pinch. Able to use tweezers to place onto target with improved strength.  -SG      Cueing 3 Verbal; Demo  -SG              Exercise 4    Cueing 4 Verbal; Demo  -SG      Equipment 4 --  Rickshaw  -SG      Weights/Plates 4 25  -SG      Sets 4 4  -SG      Reps 4 10  -SG            User Key  (r) = Recorded By, (t) = Taken By, (c) = Cosigned By    Initials Name Provider Type    SG Josephine Hemphill OTR Occupational Therapist                            Time Calculation:   OT Start Time: 1305  OT Stop Time: 1348  OT Time Calculation (min): 43 min  Total Timed Code Minutes- OT: 43 minute(s)  Timed Charges  75112 - OT Therapeutic Exercise Minutes: 23  04709 - OT Therapeutic Activity Minutes: 20  Total Minutes  Timed Charges Total Minutes: 43   Total Minutes: 43     Therapy Charges for Today     Code Description Service Date Service Provider Modifiers Qty    85387351822 HC OT THER PROC EA 15 MIN 12/17/2021 Josephine Hemphill OTR GO 2    90855713689 HC OT THERAPEUTIC ACT EA 15 MIN 12/17/2021 Josephine Hemphill OTR GO 1                    STEVEN Beatty  12/17/2021  "

## 2021-12-21 ENCOUNTER — HOSPITAL ENCOUNTER (OUTPATIENT)
Dept: OCCUPATIONAL THERAPY | Facility: HOSPITAL | Age: 56
Setting detail: THERAPIES SERIES
Discharge: HOME | End: 2021-12-21

## 2021-12-21 DIAGNOSIS — R29.898 WEAKNESS OF LEFT HAND: ICD-10-CM

## 2021-12-21 DIAGNOSIS — Z98.890 H/O MICRODISCECTOMY: Primary | ICD-10-CM

## 2021-12-21 PROCEDURE — 97110 THERAPEUTIC EXERCISES: CPT | Performed by: OCCUPATIONAL THERAPIST

## 2021-12-21 PROCEDURE — 97530 THERAPEUTIC ACTIVITIES: CPT | Performed by: OCCUPATIONAL THERAPIST

## 2021-12-21 NOTE — THERAPY TREATMENT NOTE
Outpatient Occupational Therapy Neuro Treatment Note  [facility]     Patient Name: Orion Pop  : 1965  MRN: [id number]  Today's Date: 2021       Visit Date: 2021    Patient Active Problem List   Diagnosis   • Cervical disc disorder at C5-C6 level with radiculopathy   • Gout   • Hyperlipidemia   • Special screening, prostate cancer   • Annual physical exam   • Weakness of left upper extremity   • Herniation of cervical intervertebral disc with radiculopathy   • Cervical disc disease        Past Medical History:   Diagnosis Date   • Cervical disc disease    • Gout    • Hyperlipidemia    • Hypertension    • Neck pain         Past Surgical History:   Procedure Laterality Date   • APPENDECTOMY     • CERVICAL DISCECTOMY POSTERIOR FUSION WITH BRAIN LAB Left 10/21/2021    Procedure: LEFT CERVICAL 7 AND THORACIC 1 POSTERIOR DISCECTOMY WITH METRIX;  Surgeon: Lennox Bermudez MD;  Location: [facility];  Service: Neurosurgery;  Laterality: Left;   • TONSILLECTOMY     • VASECTOMY           Visit Dx:    ICD-10-CM ICD-9-CM   1. H/O microdiscectomy  Z98.890 V15.29   2. Weakness of left hand  R29.898 728.87                    OT Assessment/Plan     Row Name 21 1127          OT Assessment    Assessment Comments Pt continues to improve his overall LUE strength, he reported today that the red theraband feels fairly easy. Thinks that he needs more fine motor tasks, had difficulty with tennis ball pinch activity and using his thumb to trace the lines on the tennis ball due to decreased accessory mvt by the 4-5 digits.  -           User Key  (r) = Recorded By, (t) = Taken By, (c) = Cosigned By    Initials Name Provider Type    Josephine Sr OTR Occupational Therapist                                   OT Exercises     Row Name 21 0830             Precautions    Existing Precautions/Restrictions no known precautions/restrictions  -              Exercise 1    Exercise Name 1 Standing  -MATTY       Cueing 1 Verbal; Demo  -SG      Equipment 1 UE Ergometer  -SG      Time (Minutes) 1 4  -SG              Exercise 2    Exercise Name 2 Standing shoulder extension  -SG      Cueing 2 Verbal; Demo  -SG      Equipment 2 Theraband  -SG      Resistance 2 Red  -SG      Sets 2 3  -SG      Reps 2 10  -SG              Exercise 3    Exercise Name 3 Tricep extension standing  -SG      Cueing 3 Verbal; Demo  -SG      Equipment 3 Theraband  -SG      Resistance 3 Red  -SG      Sets 3 3  -SG      Reps 3 10  -SG              Exercise 4    Exercise Name 4 Grooved pegboard using 3rd and 4th finger to manipulate and turn, unable to use 4th and 5th finger to turn  -SG      Cueing 4 Verbal; Demo  -SG              Exercise 5    Exercise Name 5 Tricky triangle design using thumb and ring finger for pinching and placing  -SG      Cueing 5 Verbal; Demo  -SG              Exercise 6    Exercise Name 6  strengthening task using tennis ball with slit to  marbles, able to initially grasp marbles using L hand but requires BUE to pinch tennis ball enough to release them into cup  -SG      Cueing 6 Verbal; Demo  -SG              Exercise 7    Exercise Name 7 Thumb maze following line on tennis ball using hand to rotate and turn, moderate difficulty  -SG      Cueing 7 Verbal; Demo  -SG              Exercise 8    Exercise Name 8 [place] manipulation task turning discs over in hand, RUE 1:11, L:26  -SG            User Key  (r) = Recorded By, (t) = Taken By, (c) = Cosigned By    Initials Name Provider Type    Josephine Sr OTR Occupational Therapist                            Time Calculation:   OT Start Time: 0800  OT Stop Time: 0845  OT Time Calculation (min): 45 min  Total Timed Code Minutes- OT: 45 minute(s)  Timed Charges  69641 - OT Therapeutic Exercise Minutes: 15  42208 - OT Therapeutic Activity Minutes: 30  Total Minutes  Timed Charges Total Minutes: 45   Total Minutes: 45     Therapy Charges for Today     Code  Description Service Date Service Provider Modifiers Qty    57926017699 HC OT THER PROC EA 15 MIN 12/21/2021 Josephine Hemphill OTR GO 1    38683801917 HC OT THERAPEUTIC ACT EA 15 MIN 12/21/2021 Josephine Hemphill OTR GO 2                    STEVEN Beatty  12/21/2021

## 2021-12-28 ENCOUNTER — HOSPITAL ENCOUNTER (OUTPATIENT)
Dept: OCCUPATIONAL THERAPY | Facility: HOSPITAL | Age: 56
Setting detail: THERAPIES SERIES
Discharge: HOME | End: 2021-12-28

## 2021-12-28 DIAGNOSIS — Z98.890 H/O MICRODISCECTOMY: Primary | ICD-10-CM

## 2021-12-28 DIAGNOSIS — R29.898 WEAKNESS OF LEFT HAND: ICD-10-CM

## 2021-12-28 PROCEDURE — 97530 THERAPEUTIC ACTIVITIES: CPT | Performed by: OCCUPATIONAL THERAPIST

## 2021-12-28 PROCEDURE — 97110 THERAPEUTIC EXERCISES: CPT | Performed by: OCCUPATIONAL THERAPIST

## 2021-12-28 NOTE — THERAPY TREATMENT NOTE
Outpatient Occupational Therapy Neuro Treatment Note  [facility]     Patient Name: Orion Pop  : 1965  MRN: [id number]  Today's Date: 2021       Visit Date: 2021    Patient Active Problem List   Diagnosis   • Cervical disc disorder at C5-C6 level with radiculopathy   • Gout   • Hyperlipidemia   • Special screening, prostate cancer   • Annual physical exam   • Weakness of left upper extremity   • Herniation of cervical intervertebral disc with radiculopathy   • Cervical disc disease        Past Medical History:   Diagnosis Date   • Cervical disc disease    • Gout    • Hyperlipidemia    • Hypertension    • Neck pain         Past Surgical History:   Procedure Laterality Date   • APPENDECTOMY     • CERVICAL DISCECTOMY POSTERIOR FUSION WITH BRAIN LAB Left 10/21/2021    Procedure: LEFT CERVICAL 7 AND THORACIC 1 POSTERIOR DISCECTOMY WITH METRIX;  Surgeon: Lennox Bermudez MD;  Location: [facility] OR;  Service: Neurosurgery;  Laterality: Left;   • TONSILLECTOMY     • VASECTOMY           Visit Dx:    ICD-10-CM ICD-9-CM   1. H/O microdiscectomy  Z98.890 V15.29   2. Weakness of left hand  R29.898 728.87                    OT Assessment/Plan     Row Name 21 1000          OT Assessment    Functional Limitations Limitation in home management; Limitations in community activities; Performance in work activities; Performance in self-care ADL  -KP     Impairments Coordination; Dexterity; Sensation; Range of motion; Muscle strength  -KP     Assessment Comments pt cont to do well in therapy, worked on strength w. arm bike 4 min standing and green theraband in standing fl/ext and abd/add 10x3. completed tricky triangle w min to mod difficulty, only dropped about 3 marbles, completed in hand manipulation w. L hand w min difficulty. Pt completed sm color peg design w. L digits using thumb and middle finger, thumb and ring finger, thumb and pinky to incr FMC. Completed purdue peg board w L hand to incr  "FMC w min difficulty using neat pincer grasp. pt progressing well overall. Cont OT to incr FMC, ADLs and strength.  -KP           User Key  (r) = Recorded By, (t) = Taken By, (c) = Cosigned By    Initials Name Provider Type    Maddie Jon, OTR Occupational Therapist                           Therapy Education  Given: HEP  Program: Reinforced  How Provided: Verbal  Provided to: Patient  Level of Understanding: Teach back education performed, Demonstrated, Verbalized       OT Exercises     Row Name 12/28/21 1000             Precautions    Existing Precautions/Restrictions no known precautions/restrictions  -KP              Subjective Comments    Subjective Comments \"I have been working on fine motor tasks at home w. small items such as pennies and completing in hand manipulation\"  -KP              Subjective Pain    Able to rate subjective pain? yes  -KP      Pre-Treatment Pain Level 0  -KP              Exercise 1    Exercise Name 1 standing  -KP      Cueing 1 Verbal  -KP      Equipment 1 --  arm bike  -KP      Weights/Plates 1 --  4 min arm bike  -KP      Time (Minutes) 1 4  -KP              Exercise 2    Exercise Name 2 shoulder fl/ext  -KP      Cueing 2 Verbal; Demo  -KP      Equipment 2 Theraband  -KP      Weights/Plates 2 --  green band  -KP      Resistance 2 Green  -KP      Sets 2 3  -KP      Reps 2 10  -KP              Exercise 3    Exercise Name 3 abd/add L UE  -KP      Cueing 3 Verbal; Demo  -KP      Equipment 3 Theraband  -KP      Resistance 3 Green  -KP      Sets 3 3  -KP      Reps 3 10  -KP              Exercise 4    Exercise Name 4 using L hand completes sm color peg design using middle digit and thumb to insert into board and thumb and ring finger then thumb and pinky to remove pegs from board to incr FMC  -KP      Cueing 4 Verbal  -KP      Equipment 4 Other  peg board  -KP              Exercise 5    Exercise Name 5 tricky triangle w L hand completing in hand manipulation then inserting " onto triangle board w min difficulty this date and only drops 3 marbles. a L hand.  -KP      Cueing 5 Verbal; Demo  -KP              Exercise 6    Exercise Name 6 purdue peg board completed all w L digits using index and thumb, middle and thumb, ring finger and them etc to incr FMC w min to mod difficulty  -KP      Cueing 6 Verbal; Demo  -KP            User Key  (r) = Recorded By, (t) = Taken By, (c) = Cosigned By    Initials Name Provider Type    Maddie Jon OTR Occupational Therapist                            Time Calculation:   OT Start Time: 0800  OT Stop Time: 0845  OT Time Calculation (min): 45 min  Timed Charges  12384 - OT Therapeutic Exercise Minutes: 15  69103 - OT Therapeutic Activity Minutes: 30  Total Minutes  Timed Charges Total Minutes: 45   Total Minutes: 45     Therapy Charges for Today     Code Description Service Date Service Provider Modifiers Qty    63559512628  OT THER PROC EA 15 MIN 12/28/2021 Maddie Mejia OTR GO 1    [id number]  OT THERAPEUTIC ACT EA 15 MIN 12/28/2021 Maddie Mejia OTR GO 2                    STEVEN Garza  12/28/2021

## 2022-01-04 ENCOUNTER — HOSPITAL ENCOUNTER (OUTPATIENT)
Dept: OCCUPATIONAL THERAPY | Facility: HOSPITAL | Age: 57
Setting detail: THERAPIES SERIES
Discharge: HOME | End: 2022-01-04

## 2022-01-04 DIAGNOSIS — Z98.890 H/O MICRODISCECTOMY: Primary | ICD-10-CM

## 2022-01-04 DIAGNOSIS — R29.898 WEAKNESS OF LEFT HAND: ICD-10-CM

## 2022-01-04 PROCEDURE — 97530 THERAPEUTIC ACTIVITIES: CPT | Performed by: OCCUPATIONAL THERAPIST

## 2022-01-04 NOTE — THERAPY TREATMENT NOTE
Outpatient Occupational Therapy Neuro Treatment Note  [facility]     Patient Name: Orion Pop  : 1965  MRN: [id number]  Today's Date: 2022       Visit Date: 2022    Patient Active Problem List   Diagnosis   • Cervical disc disorder at C5-C6 level with radiculopathy   • Gout   • Hyperlipidemia   • Special screening, prostate cancer   • Annual physical exam   • Weakness of left upper extremity   • Herniation of cervical intervertebral disc with radiculopathy   • Cervical disc disease        Past Medical History:   Diagnosis Date   • Cervical disc disease    • Gout    • Hyperlipidemia    • Hypertension    • Neck pain         Past Surgical History:   Procedure Laterality Date   • APPENDECTOMY     • CERVICAL DISCECTOMY POSTERIOR FUSION WITH BRAIN LAB Left 10/21/2021    Procedure: LEFT CERVICAL 7 AND THORACIC 1 POSTERIOR DISCECTOMY WITH METRIX;  Surgeon: Lennox Bermudez MD;  Location: [facility];  Service: Neurosurgery;  Laterality: Left;   • TONSILLECTOMY     • VASECTOMY           Visit Dx:    ICD-10-CM ICD-9-CM   1. H/O microdiscectomy  Z98.890 V15.29   2. Weakness of left hand  R29.898 728.87                    OT Assessment/Plan     Row Name 22 0903          OT Assessment    Assessment Comments Discussion today regarding very slow progress with digit extension, concern for muscle and tendon elongation with fingers being in poor positioning for so long and progress will be slow and long. Pt continues to work very hard and is showing progress however. Worked today on assisted tendon glides by coban wrapping fingers together to encourage full extension. Also worked on flexion stretching at each joint and pt agrees his hand is very tight. He will continue to benefit from OT to address deficits.  -SG           User Key  (r) = Recorded By, (t) = Taken By, (c) = Cosigned By    Initials Name Provider Type    Josephine Sr OTR Occupational Therapist                           Therapy  "Education  Education Details: Coban wrapping for tendon glides, joint blocking, composite finger flexion stretch  Given: HEP  Program: New, Reinforced  How Provided: Verbal, Demonstration, Written  Provided to: Patient  Level of Understanding: Verbalized, Demonstrated     Modalities     Row Name 01/04/22 0900             ELECTRICAL STIMULATION    Attended/Unattended Attended  -SG      Stimulation Type FES  -SG      Max mAmp 20  -SG      Location/Electrode Placement/Other Attempt digit extension with NMES, still no active ring/small finger extension with NMES even with use of ultrasound to find muscle bellys  -SG            User Key  (r) = Recorded By, (t) = Taken By, (c) = Cosigned By    Initials Name Provider Type    Josephine Sr, BRANDONR Occupational Therapist               OT Exercises     Row Name 01/04/22 0856             Precautions    Existing Precautions/Restrictions no known precautions/restrictions  -SG              Subjective Comments    Subjective Comments \"I'm having wrist pain after I wake up and take my splint off.\"  -SG              Subjective Pain    Able to rate subjective pain? yes  -SG      Subjective Pain Comment Discomfort at wrist joint with composite wrist flexion  -SG              Exercise 1    Exercise Name 1 OT provides composite digit flexion individually while keeping other fingers passively in extension  -SG      Cueing 1 Verbal; Auditory  -SG      Reps 1 5  -SG              Exercise 2    Exercise Name 2 Passive joint blocking stretch to PIP/DIP  -SG      Cueing 2 Verbal; Demo  -SG      Reps 2 5  -SG              Exercise 3    Exercise Name 3 Active flexion during joint blocking  -SG      Cueing 3 Verbal; Demo  -SG      Reps 3 5  -SG              Exercise 4    Exercise Name 4 Isolated MCP flexion stretch active/passive; OT keeps other digits in full extension  -SG      Cueing 4 Verbal  -SG              Exercise 5    Exercise Name 5 Coban wrap digits in hook grasp, work on hook and " roll into fist and extend  -SG      Cueing 5 Verbal; Demo  -SG      Sets 5 2  -SG      Reps 5 10  -SG              Exercise 6    Exercise Name 6 Coban wrap digits for table top positioning, half fist, full fist  -SG      Cueing 6 Verbal; Demo  -SG      Sets 6 2  -SG      Reps 6 10  -SG            User Key  (r) = Recorded By, (t) = Taken By, (c) = Cosigned By    Initials Name Provider Type    Josephine Sr OTR Occupational Therapist                            Time Calculation:   OT Start Time: 0800  OT Stop Time: 0850  OT Time Calculation (min): 50 min  Total Timed Code Minutes- OT: 50 minute(s)  Timed Charges  91003 - OT Therapeutic Activity Minutes: 50  Total Minutes  Timed Charges Total Minutes: 50   Total Minutes: 50     Therapy Charges for Today     Code Description Service Date Service Provider Modifiers Qty    04847575791  OT THERAPEUTIC ACT EA 15 MIN 1/4/2022 Josephine Hemphill OTR GO 3                    STEVEN Beatty  1/4/2022

## 2022-01-07 ENCOUNTER — HOSPITAL ENCOUNTER (OUTPATIENT)
Dept: OCCUPATIONAL THERAPY | Facility: HOSPITAL | Age: 57
Setting detail: THERAPIES SERIES
Discharge: HOME | End: 2022-01-07

## 2022-01-07 DIAGNOSIS — R29.898 WEAKNESS OF LEFT HAND: ICD-10-CM

## 2022-01-07 DIAGNOSIS — Z98.890 H/O MICRODISCECTOMY: Primary | ICD-10-CM

## 2022-01-07 PROCEDURE — 97110 THERAPEUTIC EXERCISES: CPT | Performed by: OCCUPATIONAL THERAPIST

## 2022-01-07 PROCEDURE — 97530 THERAPEUTIC ACTIVITIES: CPT | Performed by: OCCUPATIONAL THERAPIST

## 2022-01-07 NOTE — THERAPY TREATMENT NOTE
"Outpatient Occupational Therapy Neuro Treatment Note  [facility]     Patient Name: Orion Pop  : 1965  MRN: [id number]  Today's Date: 2022       Visit Date: 2022    Patient Active Problem List   Diagnosis   • Cervical disc disorder at C5-C6 level with radiculopathy   • Gout   • Hyperlipidemia   • Special screening, prostate cancer   • Annual physical exam   • Weakness of left upper extremity   • Herniation of cervical intervertebral disc with radiculopathy   • Cervical disc disease        Past Medical History:   Diagnosis Date   • Cervical disc disease    • Gout    • Hyperlipidemia    • Hypertension    • Neck pain         Past Surgical History:   Procedure Laterality Date   • APPENDECTOMY     • CERVICAL DISCECTOMY POSTERIOR FUSION WITH BRAIN LAB Left 10/21/2021    Procedure: LEFT CERVICAL 7 AND THORACIC 1 POSTERIOR DISCECTOMY WITH METRIX;  Surgeon: Lennox Bermudez MD;  Location: [facility] MAIN OR;  Service: Neurosurgery;  Laterality: Left;   • TONSILLECTOMY     • VASECTOMY           Visit Dx:    ICD-10-CM ICD-9-CM   1. H/O microdiscectomy  Z98.890 V15.29   2. Weakness of left hand  R29.898 728.87                    OT Assessment/Plan     Row Name 22 1404          OT Assessment    Assessment Comments Pt works on isolated hand and thumb intrinsic strengthening and stretches. Pt does demo lumbrical tightness and even some pain with passive stretches. He does well with tiffany trapping task today, increased number of coins to 5 and added verticle component. Encouraged to continue wrist strengthening.  -SG           User Key  (r) = Recorded By, (t) = Taken By, (c) = Cosigned By    Initials Name Provider Type    Josephine Sr OTR Occupational Therapist                           Therapy Education  Education Details: EPB/ABP firing exercises, thumb puppet, \"c\" positioning       OT Exercises     Row Name 22 1300             Subjective Comments    Subjective Comments \"I haven't done my " "exercises this week.\"  -SG              Subjective Pain    Able to rate subjective pain? yes  -SG      Pre-Treatment Pain Level 0  -SG              Exercise 1    Exercise Name 1 L hand lumbricle stretch, OT assists with deeper stretch, some pain noted  -SG      Cueing 1 Verbal; Demo; Auditory  -SG      Sets 1 3  -SG      Time (Seconds) 1 15  Hold  -SG              Exercise 2    Exercise Name 2 Palmar stretch for wrist extension  -SG      Cueing 2 Verbal; Demo  -SG      Sets 2 3  -SG      Time (Seconds) 2 15  -SG              Exercise 3    Exercise Name 3 Finger hook stretch with active contraction  -SG      Cueing 3 Verbal; Demo  -SG      Sets 3 2  -SG      Time (Seconds) 3 10  Each finger  -SG              Exercise 4    Exercise Name 4 \"C\" positioning with hold, assist to get fingers into proper positioning and hollowing out hand  -SG      Cueing 4 Verbal  -SG      Sets 4 3  -SG      Time (Seconds) 4 10  -SG              Exercise 5    Exercise Name 5 L thumb EPB firing  -SG      Cueing 5 Verbal; Demo  -SG      Sets 5 2  -SG      Reps 5 10  -SG              Exercise 6    Exercise Name 6 Thumb APB firing  -SG      Cueing 6 Verbal; Demo  -SG      Sets 6 2  -SG      Reps 6 10  -SG              Exercise 7    Exercise Name 7 Thumb \"puppet\" positioning  -SG      Cueing 7 Verbal; Demo  -SG      Sets 7 2  -SG      Reps 7 10  -SG              Exercise 8    Exercise Name 8 Priscilla trapping, 3 coins and 5, put into verticle slot  -SG      Cueing 8 Verbal; Demo  -SG      Reps 8 30  -SG              Exercise 9    Exercise Name 9  strengthneing wiht hand gripper removing pegs, increased resistance  -SG      Cueing 9 Verbal; Demo  -SG      Sets 9 2  -SG      Reps 9 20  -SG            User Key  (r) = Recorded By, (t) = Taken By, (c) = Cosigned By    Initials Name Provider Type    Josephine Sr OTR Occupational Therapist                            Time Calculation:   OT Start Time: 1300  OT Stop Time: 1345  OT Time " Calculation (min): 45 min  Total Timed Code Minutes- OT: 45 minute(s)  Timed Charges  66652 - OT Therapeutic Exercise Minutes: 25  40729 - OT Therapeutic Activity Minutes: 20  Total Minutes  Timed Charges Total Minutes: 45   Total Minutes: 45     Therapy Charges for Today     Code Description Service Date Service Provider Modifiers Qty    74761242203 HC OT THER PROC EA 15 MIN 1/7/2022 Josephine Hemphill OTR GO 2    26464127960 HC OT THERAPEUTIC ACT EA 15 MIN 1/7/2022 Josephine Hemphill OTR GO 1                    STEVEN Beatty  1/7/2022

## 2022-01-11 ENCOUNTER — HOSPITAL ENCOUNTER (OUTPATIENT)
Dept: OCCUPATIONAL THERAPY | Facility: HOSPITAL | Age: 57
Setting detail: THERAPIES SERIES
Discharge: HOME | End: 2022-01-11

## 2022-01-11 DIAGNOSIS — Z98.890 H/O MICRODISCECTOMY: Primary | ICD-10-CM

## 2022-01-11 DIAGNOSIS — R29.898 WEAKNESS OF LEFT HAND: ICD-10-CM

## 2022-01-11 PROCEDURE — 97530 THERAPEUTIC ACTIVITIES: CPT | Performed by: OCCUPATIONAL THERAPIST

## 2022-01-11 PROCEDURE — 97110 THERAPEUTIC EXERCISES: CPT | Performed by: OCCUPATIONAL THERAPIST

## 2022-01-11 NOTE — THERAPY TREATMENT NOTE
"Outpatient Occupational Therapy Neuro Treatment Note  [facility]     Patient Name: Orion Pop  : 1965  MRN: [id number]  Today's Date: 2022       Visit Date: 2022    Patient Active Problem List   Diagnosis   • Cervical disc disorder at C5-C6 level with radiculopathy   • Gout   • Hyperlipidemia   • Special screening, prostate cancer   • Annual physical exam   • Weakness of left upper extremity   • Herniation of cervical intervertebral disc with radiculopathy   • Cervical disc disease        Past Medical History:   Diagnosis Date   • Cervical disc disease    • Gout    • Hyperlipidemia    • Hypertension    • Neck pain         Past Surgical History:   Procedure Laterality Date   • APPENDECTOMY     • CERVICAL DISCECTOMY POSTERIOR FUSION WITH BRAIN LAB Left 10/21/2021    Procedure: LEFT CERVICAL 7 AND THORACIC 1 POSTERIOR DISCECTOMY WITH METRIX;  Surgeon: Lennox Bermudez MD;  Location: [facility];  Service: Neurosurgery;  Laterality: Left;   • TONSILLECTOMY     • VASECTOMY           Visit Dx:    ICD-10-CM ICD-9-CM   1. H/O microdiscectomy  Z98.890 V15.29   2. Weakness of left hand  R29.898 728.87                    OT Assessment/Plan     Row Name 22 1045          OT Assessment    Assessment Comments Pt doing well with exercises athome and at the clinic, we are decreasing to 1x/week to ensure exercise compliance and progress being made. We discussed the long road of recovery and pt aware of importance of continued therapy at home that has been given to him.  -SG           User Key  (r) = Recorded By, (t) = Taken By, (c) = Cosigned By    Initials Name Provider Type    Josephine Sr OTR Occupational Therapist                                   OT Exercises     Row Name 22 0820             Precautions    Existing Precautions/Restrictions no known precautions/restrictions  -SG              Subjective Comments    Subjective Comments \"I can feel my ring and pinky working really " "hard with the coban wrapped exercises.\"  -SG              Exercise 1    Exercise Name 1 Wrist flex/ext  -SG      Cueing 1 Verbal; Demo  -SG      Equipment 1 Theraband  -SG      Resistance 1 Yellow  -SG      Sets 1 3  -SG      Reps 1 10  -SG              Exercise 2    Exercise Name 2 Radial/ulnar deviation  -SG      Cueing 2 Verbal; Demo  -SG      Equipment 2 Theraband  -SG      Resistance 2 Yellow  -SG      Sets 2 3  -SG      Reps 2 10  -SG              Exercise 3    Exercise Name 3 Manipulation of letter dice using all digits to manipulate, good coordination noted  -SG      Cueing 3 Verbal; Demo  -SG              Exercise 4    Exercise Name 4 Pinch of small clothespins, manipulating and translating from 3jaw ishmael to ring and pinky, pinching and placing around form; moderate difficulty  -SG      Cueing 4 Verbal; Demo  -SG              Exercise 5    Exercise Name 5 Pt traces line on tennisball with thumb and involving all 5 digits  -SG      Cueing 5 Verbal; Demo  -SG              Exercise 6    Exercise Name 6 [place] pickup with tennis ball, L hand only  -SG      Cueing 6 Verbal; Demo  -SG            User Key  (r) = Recorded By, (t) = Taken By, (c) = Cosigned By    Initials Name Provider Type    Josephine Sr OTR Occupational Therapist                            Time Calculation:   OT Start Time: 0802  OT Stop Time: 0845  OT Time Calculation (min): 43 min  Total Timed Code Minutes- OT: 43 minute(s)  Timed Charges  13323 - OT Therapeutic Exercise Minutes: 15  27636 - OT Therapeutic Activity Minutes: 28  Total Minutes  Timed Charges Total Minutes: 43   Total Minutes: 43     Therapy Charges for Today     Code Description Service Date Service Provider Modifiers Qty    22924338182 HC OT THER PROC EA 15 MIN 1/11/2022 Josephine Hemphill OTR GO 1    81815369406 HC OT THERAPEUTIC ACT EA 15 MIN 1/11/2022 Josephine Hemphill OTR GO 2                    STEVEN Beatty  1/11/2022  "

## 2022-01-13 DIAGNOSIS — E78.5 HYPERLIPIDEMIA, UNSPECIFIED HYPERLIPIDEMIA TYPE: ICD-10-CM

## 2022-01-13 RX ORDER — ATORVASTATIN CALCIUM 10 MG/1
TABLET, FILM COATED ORAL
Qty: 90 TABLET | Refills: 1 | Status: SHIPPED | OUTPATIENT
Start: 2022-01-13 | End: 2022-11-10

## 2022-01-13 NOTE — TELEPHONE ENCOUNTER
Rx Refill Note  Requested Prescriptions     Pending Prescriptions Disp Refills   • atorvastatin (LIPITOR) 10 MG tablet [Pharmacy Med Name: ATORVASTATIN 10 MG TABLET] 90 tablet 1     Sig: TAKE 1 TABLET BY MOUTH EVERY DAY      Last office visit with prescribing clinician: 8/17/2021      Next office visit with prescribing clinician: 2/15/2022            Tracie Silva MA  01/13/22, 14:43 EST

## 2022-01-14 ENCOUNTER — APPOINTMENT (OUTPATIENT)
Dept: OCCUPATIONAL THERAPY | Facility: HOSPITAL | Age: 57
End: 2022-01-14

## 2022-01-18 ENCOUNTER — APPOINTMENT (OUTPATIENT)
Dept: OCCUPATIONAL THERAPY | Facility: HOSPITAL | Age: 57
End: 2022-01-18

## 2022-01-21 ENCOUNTER — HOSPITAL ENCOUNTER (OUTPATIENT)
Dept: OCCUPATIONAL THERAPY | Facility: HOSPITAL | Age: 57
Setting detail: THERAPIES SERIES
Discharge: HOME | End: 2022-01-21

## 2022-01-21 DIAGNOSIS — R29.898 WEAKNESS OF LEFT HAND: ICD-10-CM

## 2022-01-21 DIAGNOSIS — Z98.890 H/O MICRODISCECTOMY: Primary | ICD-10-CM

## 2022-01-21 PROCEDURE — 97110 THERAPEUTIC EXERCISES: CPT | Performed by: OCCUPATIONAL THERAPIST

## 2022-01-21 PROCEDURE — 97168 OT RE-EVAL EST PLAN CARE: CPT | Performed by: OCCUPATIONAL THERAPIST

## 2022-01-21 PROCEDURE — 97530 THERAPEUTIC ACTIVITIES: CPT | Performed by: OCCUPATIONAL THERAPIST

## 2022-01-21 NOTE — THERAPY PROGRESS REPORT/RE-CERT
" Outpatient Occupational Therapy Neuro Re-Evaluation  [facility]     Patient Name: Orion Pop  : 1965  MRN: [id number]  Today's Date: 2022      Visit Date: 2022    Patient Active Problem List   Diagnosis   • Cervical disc disorder at C5-C6 level with radiculopathy   • Gout   • Hyperlipidemia   • Special screening, prostate cancer   • Annual physical exam   • Weakness of left upper extremity   • Herniation of cervical intervertebral disc with radiculopathy   • Cervical disc disease        Past Medical History:   Diagnosis Date   • Cervical disc disease    • Gout    • Hyperlipidemia    • Hypertension    • Neck pain         Past Surgical History:   Procedure Laterality Date   • APPENDECTOMY     • CERVICAL DISCECTOMY POSTERIOR FUSION WITH BRAIN LAB Left 10/21/2021    Procedure: LEFT CERVICAL 7 AND THORACIC 1 POSTERIOR DISCECTOMY WITH METRIX;  Surgeon: Lennox Bermudez MD;  Location: [facility] MAIN OR;  Service: Neurosurgery;  Laterality: Left;   • TONSILLECTOMY     • VASECTOMY           Visit Dx:      ICD-10-CM ICD-9-CM   1. H/O microdiscectomy  Z98.890 V15.29   2. Weakness of left hand  R29.898 728.87            OT Neuro     Row Name 22 1300             Subjective Comments    Subjective Comments \"I can grab my coffee with my left hand,   is getting better with the first three, last two digits  is more difficult\"  -KP              Subjective Pain    Able to rate subjective pain? yes  -KP      Pre-Treatment Pain Level 0  -KP              Coordination    Coordination Tests 9-Hole Peg  -KP      Box and Blocks Left 63  -KP      Box and Blocks Right 67  -KP      9-Hole Peg Left 26  -KP      9-Hole Peg Right 19  -KP              Left Upper Ext    Lt Shoulder Abduction AROM WFL  -KP      Lt Shoulder Flexion AROM WFL  -KP      Lt Shoulder External Rotation AROM WFL  -KP      Lt Shoulder Internal Rotation AROM WFL  -KP      Lt Elbow Supination AROM WFL  -KP      Lt Elbow Pronation AROM WFL  " -KP      Lt Wrist Flexion AROM L wrist fl close to the same as R wrist fl  -KP              MMT (Manual Muscle Testing)    General MMT Comments R  110, L  75  -KP            User Key  (r) = Recorded By, (t) = Taken By, (c) = Cosigned By    Initials Name Provider Type    Maddie Jon, BRANDONR Occupational Therapist                Hand Therapy (last 24 hours)     Hand Eval     Row Name 01/21/22 1300             Subjective Pain    Able to rate subjective pain? yes  -KP      Pre-Treatment Pain Level 0  -KP      Post-Treatment Pain Level 0  -KP              Hand ROM Tested?    Hand ROM Tested? --  L hand last two digits more diff w extension  -KP              Hand  Strength     Strength Affected Side Bilateral  -KP               Strength Right    # Reps 1  -KP      Right Rung 2  -KP      Right  Test 1 110  -KP      Right  Test 2 110  -KP      Right  Test 3 120  -KP       Strength Average Right 113.33  -KP               Strength Left    # Reps 1  -KP      Left Rung 2  -KP      Left  Test 1 75  -KP      Left  Test 2 65  -KP      Left  Test 3 67  -KP       Strength Average Left 69  -KP              Right Hand Strength - Pinch (lbs)    Lateral 22 lbs  -KP      Tip (2 point) 15 lbs  -KP              Left Hand Strength - Pinch (lbs)    Lateral 10 lbs  -KP      Tip (2 point) 12 lbs  -KP            User Key  (r) = Recorded By, (t) = Taken By, (c) = Cosigned By    Initials Name Provider Type    Maddie Jon, OTR Occupational Therapist                    Therapy Education  Education Details: ed pt on cont HEP that he has been doing w FMC, hand strengthening and miri taping for act  Given: HEP  Program: Reinforced  How Provided: Demonstration, Verbal  Provided to: Patient  Level of Understanding: Verbalized, Teach back education performed, Demonstrated     OT Goals     Row Name 01/21/22 1537 01/21/22 1500       OT Short Term Goals    STG Date to Achieve  -- 01/21/22  -    STG 1 -- Patient to be independent with fine motor coordination home program.  -KP    STG 1 Progress -- Met  -       Long Term Goals    LTG Date to Achieve -- 02/18/22  -    LTG 1 -- Pt. to improve Box and Blocks score on LUE to 70 blocks on affected side to demonstrate increased independence with daily tasks.  -KP    LTG 1 Progress -- Ongoing; Progressing  -KP    LTG 3 -- Patient to increase L 3-5 finger ROM to WFL for increased independence for functional tasks.  -KP    LTG 3 Progress -- Progressing  -KP    LTG 4 -- Pt. to increase L  strength to 60# to increase independence with functional tasks.  -    LTG 4 Progress -- Met  -       Time Calculation    OT Goal Re-Cert Due Date 02/18/22  -KP 02/18/22  -          User Key  (r) = Recorded By, (t) = Taken By, (c) = Cosigned By    Initials Name Provider Type     Maddie Mejia, OTR Occupational Therapist                        OT Assessment/Plan     Row Name 01/21/22 1529          OT Assessment    Functional Limitations Limitation in home management; Limitations in community activities; Performance in work activities; Performance in self-care ADL  -     Impairments Coordination; Dexterity; Sensation; Range of motion; Muscle strength  -     Assessment Comments pt completed most of re evaluation this date, pt demo an increase in his  strength in B hands. FMC was a little less on his L side this date, but pt also hesitated at one point as he thought one peg was dropping, if re assessed again may do better than previous re evaluation. pt completed miri taping w last three digits to A them in performing exention during task w gross grasp activity w cones and barrels. Pt performed wrist fl/ext. ex. and forearom pron/sup ex. Pt completed purdue peg board using third digit and thumb to incr FMC skills w min to mod difficulty. pt progressing well in therapy and states he has a f/u w his doctor soon as well. cont OT to incr  FMC, dexterity skills, strength and ROM for ADL and work tasks.  -            OT Plan    OT Frequency 1x/week  -     Predicted Duration of Therapy Intervention (OT) 1 x wk for 4 weeks  -     Planned Therapy Interventions (Optional Details) strengthening; ROM (Range of Motion); postural re-education; patient/family education; neuromuscular re-education; home exercise program; motor coordination training  -           User Key  (r) = Recorded By, (t) = Taken By, (c) = Cosigned By    Initials Name Provider Type    Maddie Jon, OTR Occupational Therapist               OT Exercises     Row Name 01/21/22 1300             Exercise 1    Exercise Name 1 miri taped last three digits for fl/ext.  -KP      Cueing 1 Verbal; Demo  -KP      Sets 1 2  -KP      Reps 1 10  -KP              Exercise 2    Exercise Name 2 grasp cones and release onto randy 5x2 L hand  -KP      Cueing 2 Verbal; Demo  -KP              Exercise 3    Exercise Name 3 radial deviation able to complete 10x2, but ulnar deviation too difficult to complete  -KP      Cueing 3 Verbal; Demo  -KP      Sets 3 2  -KP      Reps 3 10  -KP              Exercise 4    Exercise Name 4 wrist fl/ext.  -KP      Cueing 4 Verbal; Demo  -KP      Sets 4 2  -KP      Reps 4 10  -KP              Exercise 5    Exercise Name 5 purdue peg board inserting into board w index and ring finger and then removing w index and pinky w L hand. w min to mod difficulty .  -KP      Cueing 5 Verbal; Demo  -KP            User Key  (r) = Recorded By, (t) = Taken By, (c) = Cosigned By    Initials Name Provider Type    Maddie Jon OTR Occupational Therapist                  9 Hole Peg  9-Hole Peg Left: 26  9-Hole Peg Right: 19  Box and Blocks  Box and Blocks Left: 63  Box and Blocks Right: 67         Time Calculation:   OT Start Time: 1345  OT Stop Time: 1430  OT Time Calculation (min): 45 min  Total Timed Code Minutes- OT: 30 minute(s)  Timed Charges  01264 - OT  Therapeutic Exercise Minutes: 15  84175 - OT Therapeutic Activity Minutes: 15  Untimed Charges  OT Eval/Re-eval Minutes: 15  Total Minutes  Timed Charges Total Minutes: 30  Untimed Charges Total Minutes: 15   Total Minutes: 45     Therapy Charges for Today     Code Description Service Date Service Provider Modifiers Qty    85570845156 HC OT THER PROC EA 15 MIN 1/21/2022 Maddie Mejia, BRANDONR GO 1    [id number]  OT THERAPEUTIC ACT EA 15 MIN 1/21/2022 Maddie Mejia OTR GO 1    [id number]  OT RE-EVAL 2 1/21/2022 Maddei Mejia OTR GO 1                     STEVEN Garza  1/21/2022

## 2022-01-25 ENCOUNTER — APPOINTMENT (OUTPATIENT)
Dept: OCCUPATIONAL THERAPY | Facility: HOSPITAL | Age: 57
End: 2022-01-25

## 2022-01-28 ENCOUNTER — HOSPITAL ENCOUNTER (OUTPATIENT)
Dept: OCCUPATIONAL THERAPY | Facility: HOSPITAL | Age: 57
Setting detail: THERAPIES SERIES
Discharge: HOME | End: 2022-01-28

## 2022-01-28 DIAGNOSIS — Z98.890 H/O MICRODISCECTOMY: Primary | ICD-10-CM

## 2022-01-28 DIAGNOSIS — R29.898 WEAKNESS OF LEFT HAND: ICD-10-CM

## 2022-01-28 PROCEDURE — 97530 THERAPEUTIC ACTIVITIES: CPT | Performed by: OCCUPATIONAL THERAPIST

## 2022-01-28 NOTE — THERAPY TREATMENT NOTE
Outpatient Occupational Therapy Neuro Treatment Note  [facility]     Patient Name: Orion Ppo  : 1965  MRN: [id number]  Today's Date: 2022       Visit Date: 2022    Patient Active Problem List   Diagnosis   • Cervical disc disorder at C5-C6 level with radiculopathy   • Gout   • Hyperlipidemia   • Special screening, prostate cancer   • Annual physical exam   • Weakness of left upper extremity   • Herniation of cervical intervertebral disc with radiculopathy   • Cervical disc disease        Past Medical History:   Diagnosis Date   • Cervical disc disease    • Gout    • Hyperlipidemia    • Hypertension    • Neck pain         Past Surgical History:   Procedure Laterality Date   • APPENDECTOMY     • CERVICAL DISCECTOMY POSTERIOR FUSION WITH BRAIN LAB Left 10/21/2021    Procedure: LEFT CERVICAL 7 AND THORACIC 1 POSTERIOR DISCECTOMY WITH METRIX;  Surgeon: Lennox Bermudez MD;  Location: [facility];  Service: Neurosurgery;  Laterality: Left;   • TONSILLECTOMY     • VASECTOMY           Visit Dx:    ICD-10-CM ICD-9-CM   1. H/O microdiscectomy  Z98.890 V15.29   2. Weakness of left hand  R29.898 728.87           Hand Therapy (last 24 hours)     Hand Eval     Row Name 22 1300             Left Extension AROM    IV- MP AROM 110  -SG      IV- PIP AROM 30  -SG      IV- DIP AROM 15  -SG      IV- VIDAL Left Extension AROM 155  -SG      V- MP AROM 120  -SG      V- PIP AROM -45  -SG      V- DIP AROM -15  -SG      V- VIDAL Left Extension AROM 60  -SG            User Key  (r) = Recorded By, (t) = Taken By, (c) = Cosigned By    Initials Name Provider Type    Josephine Sr OTR Occupational Therapist                     OT Assessment/Plan     Row Name 22 1530          OT Assessment    Assessment Comments Pt is doing very well with OP OT, feels like his  strength is improving but that he is not sure his finger extension has improved much. He has an apt with sx on 22 and we discussed  "holding OT until following that apt. He will continue HEP at home.  -SG           User Key  (r) = Recorded By, (t) = Taken By, (c) = Cosigned By    Initials Name Provider Type    Josephine Sr OTR Occupational Therapist                                   OT Exercises     Row Name 01/28/22 1300             Exercise 1    Exercise Name 1 L hand velcro board pushing/pulling with large to small dowels. Does not need to assist fingers with extension  -SG      Cueing 1 Verbal; Demo  -SG              Exercise 2    Exercise Name 2 Pt picks up checkers using 3pt pinch of thumb and digits 4-5, works on pad placement  -SG      Cueing 2 Verbal; Demo  -SG              Exercise 3    Exercise Name 3 Pt picks up and places 1/4\" square pegs 2 at a time and places into pegboard. Improved trapping noted in digits 4-5.  -SG      Cueing 3 Verbal; Tactile; Demo; Auditory  -SG              Exercise 4    Exercise Name 4 Isolated finger flexion  -SG      Cueing 4 Verbal; Demo  -SG      Equipment 4 Hand Gripper  -SG      Sets 4 3  -SG      Reps 4 5  -SG            User Key  (r) = Recorded By, (t) = Taken By, (c) = Cosigned By    Initials Name Provider Type    Josephine Sr OTR Occupational Therapist                            Time Calculation:   OT Start Time: 1302  OT Stop Time: 1345  OT Time Calculation (min): 43 min  Total Timed Code Minutes- OT: 43 minute(s)  Timed Charges  83706 - OT Therapeutic Activity Minutes: 43  Total Minutes  Timed Charges Total Minutes: 43   Total Minutes: 43     Therapy Charges for Today     Code Description Service Date Service Provider Modifiers Qty    [id number]  OT THERAPEUTIC ACT EA 15 MIN 1/28/2022 Josephine Hemphill OTR GO 3                    STEVEN Beatty  1/28/2022  "

## 2022-02-01 ENCOUNTER — APPOINTMENT (OUTPATIENT)
Dept: OCCUPATIONAL THERAPY | Facility: HOSPITAL | Age: 57
End: 2022-02-01

## 2022-02-04 ENCOUNTER — APPOINTMENT (OUTPATIENT)
Dept: OCCUPATIONAL THERAPY | Facility: HOSPITAL | Age: 57
End: 2022-02-04

## 2022-02-08 ENCOUNTER — APPOINTMENT (OUTPATIENT)
Dept: OCCUPATIONAL THERAPY | Facility: HOSPITAL | Age: 57
End: 2022-02-08

## 2022-02-11 ENCOUNTER — APPOINTMENT (OUTPATIENT)
Dept: OCCUPATIONAL THERAPY | Facility: HOSPITAL | Age: 57
End: 2022-02-11

## 2022-02-14 NOTE — PROGRESS NOTES
Subjective   Patient ID: Orion Pop is a 56 y.o. male is here today for a 2 month follow-up after left C7/T1 posterior discectomy with metrx on 10/21/21.  Pt last seen on 12/3/21 and reported improvement in the L hand/arm strength but no arm or neck pain.  She has only some weakness and numbness.  Pt instructed to continue with OT.    Today Mr. Pop reports things are progressing slowly, but things are getting better. He reports his left hand is still having some numbness and tingling that moves around to different parts of his hand. He reports at times he has discomfort but denies any pain. He reports that he is doing home exercises from OT but has not had a therapy session with OT in 3 weeks, he is scheduled for a session on Friday.     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  He is extra doing well.  He has no significant pain.  His claw deformity has improved significantly.  He uses a brace in his hand at night and was working with occupational therapy.  Is finished physical therapy and his  strength has improved considerably.  The interosseous muscles are still weak but that could improve with time.  He is back at work as an  in a can use a computer without too many problems.  I told him that there is still some room for improvement but he needs to be prepared for the possibility there could be some permanent weakness in his left hand.  I will see him in 8 months which is the year bette from surgery to see how he is doing.  If there is any severe recurrence of pain or other symptoms he is to let me know.        History of Present Illness    The following portions of the patient's history were reviewed and updated as appropriate: allergies, current medications, past family history, past medical history, past social history, past surgical history and problem list.    Review of Systems   Constitutional:  Negative for activity change and fever.   Musculoskeletal: Negative for neck pain.   Neurological: Positive for numbness. Negative for weakness.   Psychiatric/Behavioral: Negative for sleep disturbance.   All other systems reviewed and are negative.      Objective   Physical Exam  Constitutional:       Appearance: He is well-developed.   HENT:      Head: Normocephalic and atraumatic.   Eyes:      Extraocular Movements: EOM normal.      Conjunctiva/sclera: Conjunctivae normal.      Pupils: Pupils are equal, round, and reactive to light.   Neck:      Vascular: No carotid bruit.   Neurological:      Mental Status: He is oriented to person, place, and time.      Coordination: Finger-Nose-Finger Test and Heel to Shin Test normal.      Gait: Gait is intact.      Deep Tendon Reflexes:      Reflex Scores:       Tricep reflexes are 2+ on the right side and 2+ on the left side.       Bicep reflexes are 2+ on the right side and 2+ on the left side.       Brachioradialis reflexes are 2+ on the right side and 2+ on the left side.       Patellar reflexes are 2+ on the right side and 2+ on the left side.       Achilles reflexes are 2+ on the right side and 2+ on the left side.  Psychiatric:         Speech: Speech normal.       Neurologic Exam     Mental Status   Oriented to person, place, and time.   Registration of memory: Good recent and remote memory.   Attention: normal. Concentration: normal.   Speech: speech is normal   Level of consciousness: alert  Knowledge: consistent with education.     Cranial Nerves     CN II   Visual fields full to confrontation.   Visual acuity: normal    CN III, IV, VI   Pupils are equal, round, and reactive to light.  Extraocular motions are normal.     CN V   Facial sensation intact.   Right corneal reflex: normal  Left corneal reflex: normal    CN VII   Facial expression full, symmetric.   Right facial weakness: none  Left facial weakness: none    CN VIII   Hearing: intact    CN IX, X   Palate:  symmetric    CN XI   Right sternocleidomastoid strength: normal  Left sternocleidomastoid strength: normal    CN XII   Tongue: not atrophic  Tongue deviation: none    Motor Exam   Muscle bulk: normal  Right arm tone: normal  Left arm tone: normal  Right leg tone: normal  Left leg tone: normal    Strength   Strength 5/5 except as noted.   Left wrist flexion: 4/5  Left wrist extension: 4/5  Left interossei: 3/5    Sensory Exam   Light touch normal.     Gait, Coordination, and Reflexes     Gait  Gait: normal    Coordination   Finger to nose coordination: normal  Heel to shin coordination: normal    Reflexes   Right brachioradialis: 2+  Left brachioradialis: 2+  Right biceps: 2+  Left biceps: 2+  Right triceps: 2+  Left triceps: 2+  Right patellar: 2+  Left patellar: 2+  Right achilles: 2+  Left achilles: 2+  Right : 2+  Left : 2+      Assessment/Plan   Independent Review of Radiographic Studies:      Intraoperative x-rays show that the Metrix tube was at the left C7-T1 level.  Agree with the report.      Medical Decision Making:      Making very good progress.  I will see him in 8 months which is a year from the surgery.  I suspect he will be somewhat better but there may be some residual motor deficit 1 is all set and done.  Fortunately his pain has been treated sufficiently.      Diagnoses and all orders for this visit:    1. Left hand weakness (Primary)    2. Personal history of spine surgery    3. Deformity, hand acquired, claw, left      Return in about 8 months (around 10/21/2022) for Face to face.

## 2022-02-15 ENCOUNTER — OFFICE VISIT (OUTPATIENT)
Dept: FAMILY MEDICINE CLINIC | Facility: CLINIC | Age: 57
End: 2022-02-15

## 2022-02-15 ENCOUNTER — APPOINTMENT (OUTPATIENT)
Dept: OCCUPATIONAL THERAPY | Facility: HOSPITAL | Age: 57
End: 2022-02-15

## 2022-02-15 VITALS
DIASTOLIC BLOOD PRESSURE: 72 MMHG | SYSTOLIC BLOOD PRESSURE: 138 MMHG | WEIGHT: 226.2 LBS | BODY MASS INDEX: 30.64 KG/M2 | OXYGEN SATURATION: 97 % | HEIGHT: 72 IN | HEART RATE: 63 BPM | TEMPERATURE: 97.5 F

## 2022-02-15 DIAGNOSIS — M10.9 ACUTE GOUT INVOLVING TOE OF LEFT FOOT, UNSPECIFIED CAUSE: ICD-10-CM

## 2022-02-15 DIAGNOSIS — E78.5 HYPERLIPIDEMIA, UNSPECIFIED HYPERLIPIDEMIA TYPE: ICD-10-CM

## 2022-02-15 DIAGNOSIS — M1A.0790 IDIOPATHIC CHRONIC GOUT OF FOOT WITHOUT TOPHUS, UNSPECIFIED LATERALITY: ICD-10-CM

## 2022-02-15 DIAGNOSIS — Z00.00 ANNUAL PHYSICAL EXAM: Primary | ICD-10-CM

## 2022-02-15 PROCEDURE — 99396 PREV VISIT EST AGE 40-64: CPT | Performed by: INTERNAL MEDICINE

## 2022-02-15 RX ORDER — COLCHICINE 0.6 MG/1
TABLET ORAL
Qty: 10 TABLET | Refills: 0 | Status: SHIPPED | OUTPATIENT
Start: 2022-02-15

## 2022-02-15 NOTE — PROGRESS NOTES
Chief Complaint   Patient presents with   • Annual Exam       HPI:  Orion Pop, -1965, is a 56 y.o. male who presents for an annual physical.    Follow-up for cholesterol.  Currently, has been feeling well without any myalgias, muscle aches, weakness, numbness, chest pain, short of breath or other issues.  Currently, is adherent with medication regimen of atorvastatin 10 mg daily and denies medication side effects. Is due for lab follow-up.     Follow-up for hypertension.  Currently, has been feeling well and asymptomatic without any headaches, vision changes, cough, chest pain, shortness of breath, swelling, focal neurologic deficit, memory loss or syncope.  Currently, is on no medication.  Denies medication side effects and no significant interval events.      Patient was found to have gout and started on allopurinol but caused skin peeling of palms and soles.  Currently is on no medications.    Recent Hospitalizations:  Recently treated at the following:  [facility]. On 10/20/21-10/22/21 for cervical and thoracic discectomy with slow improvement in the left arm and hand.    Current Medical Providers:  Patient Care Team:  Jules Jean Baptiste MD as PCP - General (Internal Medicine)  Lennox Bermudez MD as Surgeon (Neurosurgery)    Compared to one year ago, the patient feels his physical health is the same and his mental health is the same.    Depression Screen:  PHQ-2/PHQ-9 Depression Screening 2/15/2022   Little interest or pleasure in doing things 0   Feeling down, depressed, or hopeless 0   Trouble falling or staying asleep, or sleeping too much 0   Feeling tired or having little energy 0   Poor appetite or overeating 0   Feeling bad about yourself - or that you are a failure or have let yourself or your family down 0   Trouble concentrating on things, such as reading the newspaper or watching television 0   Moving or speaking so slowly that other people could have noticed. Or the  opposite - being so fidgety or restless that you have been moving around a lot more than usual 0   Thoughts that you would be better off dead, or of hurting yourself in some way 0   Total Score 0       Past Medical/Family/Social History:  The following portions of the patient's history were reviewed and updated as appropriate: allergies, current medications, past family history, past medical history, past social history, past surgical history and problem list.    Allergies   Allergen Reactions   • Allopurinol Other (See Comments)     Palms and soles of feet were peeling while taking allopurinol         Current Outpatient Medications:   •  atorvastatin (LIPITOR) 10 MG tablet, TAKE 1 TABLET BY MOUTH EVERY DAY, Disp: 90 tablet, Rfl: 1  •  Loratadine (CLARITIN PO), Take  by mouth., Disp: , Rfl:   •  colchicine 0.6 MG tablet, 2 tabs now, then 1 tablet in an hour as needed at onset of gout flare, Disp: 10 tablet, Rfl: 0    Current medication list contains no high risk medications.  No harmful drug interactions have been identified.     Family History   Problem Relation Age of Onset   • Diabetes Mother    • Heart disease Mother 62   • Diabetes Father    • Heart disease Father 72       Social History     Tobacco Use   • Smoking status: Never Smoker   • Smokeless tobacco: Never Used   Substance Use Topics   • Alcohol use: Yes     Alcohol/week: 4.0 standard drinks     Types: 2 Cans of beer, 2 Shots of liquor per week     Comment: occassionally       Past Surgical History:   Procedure Laterality Date   • APPENDECTOMY     • CERVICAL DISCECTOMY POSTERIOR FUSION WITH BRAIN LAB Left 10/21/2021    Procedure: LEFT CERVICAL 7 AND THORACIC 1 POSTERIOR DISCECTOMY WITH METRIX;  Surgeon: Lennox Bermudez MD;  Location: [facility];  Service: Neurosurgery;  Laterality: Left;   • TONSILLECTOMY     • VASECTOMY         Patient Active Problem List   Diagnosis   • Cervical disc disorder at C5-C6 level with radiculopathy   • Gout   •  "Hyperlipidemia   • Special screening, prostate cancer   • Annual physical exam   • Weakness of left upper extremity   • Herniation of cervical intervertebral disc with radiculopathy   • Cervical disc disease       Review of Systems   Constitutional: Negative for activity change, appetite change, fatigue, fever, unexpected weight gain and unexpected weight loss.   HENT: Negative for nosebleeds, rhinorrhea, trouble swallowing and voice change.    Eyes: Negative for visual disturbance.   Respiratory: Negative for cough, chest tightness, shortness of breath and wheezing.    Cardiovascular: Negative for chest pain, palpitations and leg swelling.   Gastrointestinal: Negative for abdominal pain, blood in stool, constipation, diarrhea, nausea, vomiting, GERD and indigestion.   Genitourinary: Negative for dysuria, frequency and hematuria.   Musculoskeletal: Negative for arthralgias, back pain and myalgias.   Skin: Negative for rash and wound.   Neurological: Negative for dizziness, tremors, weakness, light-headedness, numbness, headache and memory problem.   Hematological: Negative for adenopathy. Does not bruise/bleed easily.   Psychiatric/Behavioral: Negative for sleep disturbance and depressed mood. The patient is not nervous/anxious.        Objective     Vitals:    02/15/22 0858   BP: 138/72   BP Location: Right arm   Patient Position: Sitting   Pulse: 63   Temp: 97.5 °F (36.4 °C)   SpO2: 97%   Weight: 103 kg (226 lb 3.2 oz)   Height: 182.9 cm (72.01\")       Patient's Body mass index is 30.67 kg/m². indicating that he is obese (BMI >30). Obesity-related health conditions include the following: hypertension. Obesity is worsening. BMI is is above average; BMI management plan is completed. We discussed portion control and increasing exercise..      No exam data present    Physical Exam  Vitals and nursing note reviewed.   Constitutional:       General: He is not in acute distress.     Appearance: He is well-developed. He is " not diaphoretic.   HENT:      Head: Normocephalic and atraumatic.      Right Ear: External ear normal.      Left Ear: External ear normal.      Nose: Nose normal.   Eyes:      Conjunctiva/sclera: Conjunctivae normal.      Pupils: Pupils are equal, round, and reactive to light.   Neck:      Thyroid: No thyromegaly.      Trachea: No tracheal deviation.   Cardiovascular:      Rate and Rhythm: Normal rate and regular rhythm.      Heart sounds: Normal heart sounds. No murmur heard.  No friction rub. No gallop.    Pulmonary:      Effort: Pulmonary effort is normal. No respiratory distress.      Breath sounds: Normal breath sounds.   Abdominal:      General: Bowel sounds are normal.      Palpations: Abdomen is soft. There is no mass.      Tenderness: There is no abdominal tenderness. There is no guarding.   Musculoskeletal:         General: Normal range of motion.      Cervical back: Normal range of motion and neck supple.   Lymphadenopathy:      Cervical: No cervical adenopathy.   Skin:     General: Skin is warm and dry.      Capillary Refill: Capillary refill takes less than 2 seconds.      Findings: No rash.   Neurological:      Mental Status: He is alert and oriented to person, place, and time.      Motor: No abnormal muscle tone.      Deep Tendon Reflexes: Reflexes normal.   Psychiatric:         Behavior: Behavior normal.         Thought Content: Thought content normal.         Judgment: Judgment normal.         Recent Lab Results:     Lab Results   Component Value Date    TRIG 225 (H) 07/13/2021    HDL 41 07/13/2021    VLDL 43 (H) 07/13/2021       Assessment/Plan   Age-appropriate Screening Schedule:  Refer to the list below for future screening recommendations based on patient's age, sex and/or medical conditions.      Health Maintenance   Topic Date Due   • TDAP/TD VACCINES (1 - Tdap) Never done   • ZOSTER VACCINE (1 of 2) Never done   • LIPID PANEL  07/13/2022   • INFLUENZA VACCINE  Completed       Diagnoses and  all orders for this visit:    1. Annual physical exam (Primary)    2. Hyperlipidemia, unspecified hyperlipidemia type  -     Comprehensive Metabolic Panel  -     Lipid Panel    3. Idiopathic chronic gout of foot without tophus, unspecified laterality  -     colchicine 0.6 MG tablet; 2 tabs now, then 1 tablet in an hour as needed at onset of gout flare  Dispense: 10 tablet; Refill: 0    4. Acute gout involving toe of left foot, unspecified cause  -     colchicine 0.6 MG tablet; 2 tabs now, then 1 tablet in an hour as needed at onset of gout flare  Dispense: 10 tablet; Refill: 0        Annual wellness visit reviewed with patient.  All past history, medications, social history, and problem list were reviewed.  Discussed advanced directives and living will.  Patient has living will: Living will: Patient refused.  Will check the labs as ordered above to evaluate the blood sugars, kidney, liver, cholesterol for screening.  Discussed flu shot recommended to get the influenza vaccine annually in the fall.  Shingrix and Tdap vaccination series discussed.  Encouraged follow-up with the eye doctor on annual basis.  Discussed weight and encouraged exercise as tolerated while following a healthy diet.  Reviewed sexual health and safe sex practices.  Colon cancer screening discussed and current status:  jt completed 7/20/21.  Discussed prostate screening.  Follow up with current specialists as needed.     An After Visit Summary with all of these plans were given to the patient.        Follow Up:  No follow-ups on file.

## 2022-02-16 LAB
ALBUMIN SERPL-MCNC: 4.7 G/DL (ref 3.8–4.9)
ALBUMIN/GLOB SERPL: 2 {RATIO} (ref 1.2–2.2)
ALP SERPL-CCNC: 72 IU/L (ref 44–121)
ALT SERPL-CCNC: 29 IU/L (ref 0–44)
AST SERPL-CCNC: 25 IU/L (ref 0–40)
BILIRUB SERPL-MCNC: 0.5 MG/DL (ref 0–1.2)
BUN SERPL-MCNC: 13 MG/DL (ref 6–24)
BUN/CREAT SERPL: 16 (ref 9–20)
CALCIUM SERPL-MCNC: 9.7 MG/DL (ref 8.7–10.2)
CHLORIDE SERPL-SCNC: 103 MMOL/L (ref 96–106)
CHOLEST SERPL-MCNC: 174 MG/DL (ref 100–199)
CO2 SERPL-SCNC: 23 MMOL/L (ref 20–29)
CREAT SERPL-MCNC: 0.81 MG/DL (ref 0.76–1.27)
GLOBULIN SER CALC-MCNC: 2.3 G/DL (ref 1.5–4.5)
GLUCOSE SERPL-MCNC: 114 MG/DL (ref 65–99)
HDLC SERPL-MCNC: 42 MG/DL
LDLC SERPL CALC-MCNC: 112 MG/DL (ref 0–99)
POTASSIUM SERPL-SCNC: 5.1 MMOL/L (ref 3.5–5.2)
PROT SERPL-MCNC: 7 G/DL (ref 6–8.5)
SODIUM SERPL-SCNC: 140 MMOL/L (ref 134–144)
TRIGL SERPL-MCNC: 109 MG/DL (ref 0–149)
VLDLC SERPL CALC-MCNC: 20 MG/DL (ref 5–40)

## 2022-02-18 ENCOUNTER — APPOINTMENT (OUTPATIENT)
Dept: OCCUPATIONAL THERAPY | Facility: HOSPITAL | Age: 57
End: 2022-02-18

## 2022-02-21 ENCOUNTER — OFFICE VISIT (OUTPATIENT)
Dept: NEUROSURGERY | Facility: CLINIC | Age: 57
End: 2022-02-21

## 2022-02-21 VITALS
WEIGHT: 227.07 LBS | DIASTOLIC BLOOD PRESSURE: 78 MMHG | TEMPERATURE: 98 F | SYSTOLIC BLOOD PRESSURE: 120 MMHG | BODY MASS INDEX: 30.76 KG/M2 | HEIGHT: 72 IN | OXYGEN SATURATION: 98 % | HEART RATE: 71 BPM

## 2022-02-21 DIAGNOSIS — Z98.890 PERSONAL HISTORY OF SPINE SURGERY: ICD-10-CM

## 2022-02-21 DIAGNOSIS — M21.512: ICD-10-CM

## 2022-02-21 DIAGNOSIS — R29.898 LEFT HAND WEAKNESS: Primary | ICD-10-CM

## 2022-02-21 PROCEDURE — 99213 OFFICE O/P EST LOW 20 MIN: CPT | Performed by: NEUROLOGICAL SURGERY

## 2022-02-25 ENCOUNTER — HOSPITAL ENCOUNTER (OUTPATIENT)
Dept: OCCUPATIONAL THERAPY | Facility: HOSPITAL | Age: 57
Setting detail: THERAPIES SERIES
Discharge: HOME | End: 2022-02-25

## 2022-02-25 DIAGNOSIS — R29.898 WEAKNESS OF LEFT HAND: ICD-10-CM

## 2022-02-25 DIAGNOSIS — Z98.890 H/O MICRODISCECTOMY: Primary | ICD-10-CM

## 2022-02-25 PROCEDURE — 97530 THERAPEUTIC ACTIVITIES: CPT | Performed by: OCCUPATIONAL THERAPIST

## 2022-02-25 NOTE — THERAPY DISCHARGE NOTE
Outpatient Occupational Therapy Neuro Treatment Note/Discharge Summary  [facility]     Patient Name: Orion Pop  : 1965  MRN: [id number]  Today's Date: 2022      Visit Date: 2022    Patient Active Problem List   Diagnosis   • Cervical disc disorder at C5-C6 level with radiculopathy   • Gout   • Hyperlipidemia   • Special screening, prostate cancer   • Annual physical exam   • Left hand weakness   • Herniation of cervical intervertebral disc with radiculopathy   • Cervical disc disease   • Personal history of spine surgery   • Deformity, hand acquired, claw, left        Past Medical History:   Diagnosis Date   • Cervical disc disease    • Gout    • Hyperlipidemia    • Hypertension    • Neck pain         Past Surgical History:   Procedure Laterality Date   • APPENDECTOMY     • CERVICAL DISCECTOMY POSTERIOR FUSION WITH BRAIN LAB Left 10/21/2021    Procedure: LEFT CERVICAL 7 AND THORACIC 1 POSTERIOR DISCECTOMY WITH METRIX;  Surgeon: Lennox Bermudez MD;  Location: [facility] OR;  Service: Neurosurgery;  Laterality: Left;   • TONSILLECTOMY     • VASECTOMY           Visit Dx:    ICD-10-CM ICD-9-CM   1. H/O microdiscectomy  Z98.890 V15.29   2. Weakness of left hand  R29.898 728.87           Hand Therapy (last 24 hours)     Hand Eval     Row Name 22 1400              Strength Right    # Reps 1  -SG      Right Rung 2  -SG      Right  Test 1 106  -SG       Strength Average Right 106  -SG               Strength Left    # Reps 3  -SG      Left Rung 2  -SG      Left  Test 1 75  -SG      Left  Test 2 66  -SG      Left  Test 3 71  -SG       Strength Average Left 70.67  -SG              Right Hand Strength - Pinch (lbs)    Lateral 27 lbs  -SG      Tip (2 point) 16 lbs  -SG              Left Hand Strength - Pinch (lbs)    Lateral 13 lbs  -SG      Tip (2 point) 13 lbs  -SG            User Key  (r) = Recorded By, (t) = Taken By, (c) = Cosigned By    Initials Name  Provider Type    Josephine Sr OTR Occupational Therapist                          OT Goals     Row Name 02/25/22 1400          OT Short Term Goals    STG 1 Patient to be independent with fine motor coordination home program.  -SG     STG 1 Progress Met  -SG     STG 2 Patient to improve 9 hole peg test score to </= 30 sec on LUE to demonstrate increased independence with fine motor tasks.  -SG     STG 2 Progress Met  -SG     STG 3 Pt. and family to be independent with UE ROM HEP.  -SG     STG 3 Progress Met  -SG     STG 4 Pt. to increase L  strength to 50# to increase independence with functional tasks.  -SG     STG 4 Progress Met  -SG     STG 5 Pt to be instructed on home NMES unit for LUE to assist in improving ROM and strength and to prevent contracture deformity in LUE.  -SG     STG 5 Progress Met  -SG            Long Term Goals    LTG 1 Pt. to improve Box and Blocks score on LUE to 70 blocks on affected side to demonstrate increased independence with daily tasks.  -SG     LTG 1 Progress Met  -SG     LTG 2 Patient to improve 9 hole peg test score to </= 25 sec on LUE to demonstrate increased independence with fine motor tasks.  -SG     LTG 2 Progress Met  -SG     LTG 3 Patient to increase L 3-5 finger ROM to WFL for increased independence for functional tasks.  -SG     LTG 3 Progress Not Met  -SG     LTG 4 Pt. to increase L  strength to 60# to increase independence with functional tasks.  -SG     LTG 4 Progress Met  -SG     LTG 5 Pt/family to demo independence with splint wear to prevent contractures with L hand and wrist  -SG     LTG 5 Progress Met  -SG           User Key  (r) = Recorded By, (t) = Taken By, (c) = Cosigned By    Initials Name Provider Type    Josephine Sr OTR Occupational Therapist                Therapy Education  Education Details: Typing drills for L hand, review of HEP, progressed theraputty HEP.         OT Exercises     Row Name 02/25/22 1400             Subjective  "Comments    Subjective Comments \"I can snap finally.\"  -SG              Exercise 1    Exercise Name 1 Finger adduction, finger flexion  -SG      Cueing 1 Verbal; Demo  -SG      Equipment 1 Theraputty  -SG      Resistance 1 Yellow  -SG              Exercise 2    Exercise Name 2 Pt practices typing drills with the L hand working on isolated finger flexion/extension  -SG      Cueing 2 Verbal; Demo  -SG            User Key  (r) = Recorded By, (t) = Taken By, (c) = Cosigned By    Initials Name Provider Type    Josephine Sr OTR Occupational Therapist                    9 Hole Peg  9-Hole Peg Left: 20  Box and Blocks  Box and Blocks Left: 70  Other Outcome Measure Tool Used  Other Outcome Measure Tool Comments: UEFI 67/80      Time Calculation:   OT Start Time: 1300  OT Stop Time: 1345  OT Time Calculation (min): 45 min  Total Timed Code Minutes- OT: 45 minute(s)  Timed Charges  53510 - OT Therapeutic Activity Minutes: 45  Total Minutes  Timed Charges Total Minutes: 45   Total Minutes: 45     Therapy Charges for Today     Code Description Service Date Service Provider Modifiers Qty    [id number]  OT THERAPEUTIC ACT EA 15 MIN 2/25/2022 Josephine Hemphill OTR GO 3                OP OT Discharge Summary  Date of Discharge: 02/25/22  Reason for Discharge: Maximum functional potential achieved  Outcomes Achieved: Patient able to partially acheive established goals  Discharge Destination: Home with home program  Discharge Instructions: Pt returns to OT today following follow up with neuro sx. Per pt and MD he reports he has continued to progress with his hand but that he may have residual deficits following his 1 year bette from surgery. He has significantly improved his  and pinch strengths as well as his gross and fine motor skills. He does continue to lack digits 4-5 full finger extension but is doing most functional things at home he needs to do. He will continue his ROM, strength, and coordination HEP at home " as well as wearing his splint at night. OT will d/c at this time.        Josephine Hemphill, OTR  2/25/2022

## 2022-03-04 ENCOUNTER — APPOINTMENT (OUTPATIENT)
Dept: OCCUPATIONAL THERAPY | Facility: HOSPITAL | Age: 57
End: 2022-03-04

## 2022-03-11 ENCOUNTER — APPOINTMENT (OUTPATIENT)
Dept: OCCUPATIONAL THERAPY | Facility: HOSPITAL | Age: 57
End: 2022-03-11

## 2022-03-14 ENCOUNTER — TELEPHONE (OUTPATIENT)
Dept: FAMILY MEDICINE CLINIC | Facility: CLINIC | Age: 57
End: 2022-03-14

## 2022-03-18 ENCOUNTER — CLINICAL SUPPORT (OUTPATIENT)
Dept: FAMILY MEDICINE CLINIC | Facility: CLINIC | Age: 57
End: 2022-03-18

## 2022-03-18 ENCOUNTER — APPOINTMENT (OUTPATIENT)
Dept: OCCUPATIONAL THERAPY | Facility: HOSPITAL | Age: 57
End: 2022-03-18

## 2022-03-18 DIAGNOSIS — Z23 IMMUNIZATION DUE: Primary | ICD-10-CM

## 2022-03-18 PROCEDURE — 90750 HZV VACC RECOMBINANT IM: CPT | Performed by: INTERNAL MEDICINE

## 2022-05-25 ENCOUNTER — CLINICAL SUPPORT (OUTPATIENT)
Dept: FAMILY MEDICINE CLINIC | Facility: CLINIC | Age: 57
End: 2022-05-25

## 2022-05-25 DIAGNOSIS — Z23 IMMUNIZATION DUE: Primary | ICD-10-CM

## 2022-05-25 PROCEDURE — 90750 HZV VACC RECOMBINANT IM: CPT | Performed by: INTERNAL MEDICINE
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
## 2022-08-09 ENCOUNTER — OFFICE VISIT (OUTPATIENT)
Dept: NEUROSURGERY | Facility: CLINIC | Age: 57
End: 2022-08-09

## 2022-08-09 DIAGNOSIS — R29.898 LEFT HAND WEAKNESS: ICD-10-CM

## 2022-08-09 DIAGNOSIS — Z98.890 PERSONAL HISTORY OF SPINE SURGERY: Primary | ICD-10-CM
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
## 2022-08-09 PROCEDURE — 99442 PR PHYS/QHP TELEPHONE EVALUATION 11-20 MIN: CPT | Performed by: NEUROLOGICAL SURGERY

## 2022-10-10 ENCOUNTER — HOSPITAL ENCOUNTER (OUTPATIENT)
Dept: GENERAL RADIOLOGY | Facility: HOSPITAL | Age: 57
Discharge: HOME | End: 2022-10-10
Admitting: NEUROLOGICAL SURGERY

## 2022-10-10 DIAGNOSIS — R29.898 LEFT HAND WEAKNESS: ICD-10-CM

## 2022-10-10 DIAGNOSIS — Z98.890 PERSONAL HISTORY OF SPINE SURGERY: ICD-10-CM
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
## 2022-10-10 PROCEDURE — 72050 X-RAY EXAM NECK SPINE 4/5VWS: CPT

## 2022-10-19 ENCOUNTER — OFFICE VISIT (OUTPATIENT)
Dept: FAMILY MEDICINE CLINIC | Facility: CLINIC | Age: 57
End: 2022-10-19

## 2022-10-19 VITALS
HEART RATE: 80 BPM | WEIGHT: 222 LBS | DIASTOLIC BLOOD PRESSURE: 80 MMHG | OXYGEN SATURATION: 99 % | TEMPERATURE: 98 F | SYSTOLIC BLOOD PRESSURE: 132 MMHG | HEIGHT: 72 IN | BODY MASS INDEX: 30.07 KG/M2

## 2022-10-19 DIAGNOSIS — R09.89 CHEST CONGESTION: ICD-10-CM

## 2022-10-19 DIAGNOSIS — J06.9 UPPER RESPIRATORY TRACT INFECTION, UNSPECIFIED TYPE: Primary | ICD-10-CM

## 2022-10-19 LAB
EXPIRATION DATE: NORMAL
FLUAV AG UPPER RESP QL IA.RAPID: NOT DETECTED
FLUBV AG UPPER RESP QL IA.RAPID: NOT DETECTED
INTERNAL CONTROL: NORMAL
Lab: NORMAL
SARS-COV-2 AG UPPER RESP QL IA.RAPID: NOT DETECTED

## 2022-10-19 PROCEDURE — 87428 SARSCOV & INF VIR A&B AG IA: CPT | Performed by: FAMILY MEDICINE

## 2022-10-19 PROCEDURE — 99213 OFFICE O/P EST LOW 20 MIN: CPT | Performed by: FAMILY MEDICINE

## 2022-10-19 RX ORDER — DOXYCYCLINE HYCLATE 100 MG/1
100 CAPSULE ORAL 2 TIMES DAILY
Qty: 20 CAPSULE | Refills: 0 | Status: SHIPPED | OUTPATIENT
Start: 2022-10-19

## 2022-10-19 NOTE — PROGRESS NOTES
"Chief Complaint  Cough and URI    Subjective        Orion Pop presents to [facility]  History of Present Illness  Cough since Friday with sinus congestion, on Robitussin, yellow to brown sputum, non smoker, sore throat from cough, + covid vaccine, no fever . It happens every year this time of the year  Objective   Vital Signs:  /80 (BP Location: Left arm, Patient Position: Sitting, Cuff Size: Large Adult)   Pulse 80   Temp 98 °F (36.7 °C) (Temporal)   Ht 182.9 cm (72.01\")   Wt 101 kg (222 lb)   SpO2 99%   BMI 30.10 kg/m²   Estimated body mass index is 30.1 kg/m² as calculated from the following:    Height as of this encounter: 182.9 cm (72.01\").    Weight as of this encounter: 101 kg (222 lb).          Physical Exam  Vitals and nursing note reviewed.   Constitutional:       General: He is not in acute distress.     Appearance: Normal appearance. He is well-developed. He is not ill-appearing, toxic-appearing or diaphoretic.   HENT:      Head: Normocephalic and atraumatic.      Right Ear: Tympanic membrane, ear canal and external ear normal. There is no impacted cerumen.      Left Ear: Tympanic membrane, ear canal and external ear normal. There is no impacted cerumen.      Nose: Nose normal. No congestion or rhinorrhea.      Mouth/Throat:      Mouth: Mucous membranes are moist.      Pharynx: Oropharynx is clear. No oropharyngeal exudate or posterior oropharyngeal erythema.   Eyes:      General: No scleral icterus.        Right eye: No discharge.         Left eye: No discharge.      Extraocular Movements: Extraocular movements intact.      Conjunctiva/sclera: Conjunctivae normal.      Pupils: Pupils are equal, round, and reactive to light.   Neck:      Thyroid: No thyromegaly.      Vascular: No carotid bruit or JVD.      Trachea: No tracheal deviation.   Cardiovascular:      Rate and Rhythm: Normal rate and regular rhythm.      Heart sounds: Normal heart sounds. No murmur " heard.    No friction rub. No gallop.   Pulmonary:      Effort: Pulmonary effort is normal. No respiratory distress.      Breath sounds: Normal breath sounds. No stridor. No wheezing, rhonchi or rales.   Chest:      Chest wall: No tenderness.   Abdominal:      General: Bowel sounds are normal. There is no distension.      Palpations: Abdomen is soft. There is no mass.      Tenderness: There is no abdominal tenderness. There is no right CVA tenderness, left CVA tenderness, guarding or rebound.      Hernia: No hernia is present.   Musculoskeletal:         General: Normal range of motion.      Cervical back: Normal range of motion and neck supple. No rigidity or tenderness.      Right lower leg: No edema.      Left lower leg: No edema.   Lymphadenopathy:      Cervical: No cervical adenopathy.   Skin:     General: Skin is warm and dry.      Coloration: Skin is not jaundiced.   Neurological:      General: No focal deficit present.      Mental Status: He is alert and oriented to person, place, and time. Mental status is at baseline.      Sensory: No sensory deficit.      Motor: No weakness or abnormal muscle tone.      Coordination: Coordination normal.      Gait: Gait normal.      Deep Tendon Reflexes: Reflexes normal.   Psychiatric:         Mood and Affect: Mood normal.         Behavior: Behavior normal.         Thought Content: Thought content normal.         Judgment: Judgment normal.        Result Review :                Assessment and Plan   Diagnoses and all orders for this visit:    1. Upper respiratory tract infection, unspecified type (Primary)  -     doxycycline (VIBRAMYCIN) 100 MG capsule; Take 1 capsule by mouth 2 (Two) Times a Day.  Dispense: 20 capsule; Refill: 0    2. Chest congestion  -     POCT SARS-CoV-2 Antigen VICTORIA + Flu    declined  cough med         Follow Up   Return if symptoms worsen or fail to improve.  Patient was given instructions and counseling regarding his condition or for health  maintenance advice. Please see specific information pulled into the AVS if appropriate.

## 2022-10-24 ENCOUNTER — OFFICE VISIT (OUTPATIENT)
Dept: NEUROSURGERY | Facility: CLINIC | Age: 57
End: 2022-10-24

## 2022-10-24 VITALS
HEART RATE: 64 BPM | WEIGHT: 222 LBS | SYSTOLIC BLOOD PRESSURE: 140 MMHG | BODY MASS INDEX: 30.07 KG/M2 | HEIGHT: 72 IN | DIASTOLIC BLOOD PRESSURE: 80 MMHG | OXYGEN SATURATION: 96 %

## 2022-10-24 DIAGNOSIS — Z98.890 PERSONAL HISTORY OF SPINE SURGERY: Primary | ICD-10-CM

## 2022-10-24 DIAGNOSIS — R29.898 LEFT HAND WEAKNESS: ICD-10-CM

## 2022-10-24 DIAGNOSIS — M21.512: ICD-10-CM
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
## 2022-10-24 PROCEDURE — 99213 OFFICE O/P EST LOW 20 MIN: CPT | Performed by: NEUROLOGICAL SURGERY
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
## 2022-11-09 DIAGNOSIS — E78.5 HYPERLIPIDEMIA, UNSPECIFIED HYPERLIPIDEMIA TYPE: ICD-10-CM

## 2022-11-10 RX ORDER — ATORVASTATIN CALCIUM 10 MG/1
TABLET, FILM COATED ORAL
Qty: 90 TABLET | Refills: 1 | Status: SHIPPED | OUTPATIENT
Start: 2022-11-10

## 2022-11-10 NOTE — TELEPHONE ENCOUNTER
Rx Refill Note  Requested Prescriptions     Pending Prescriptions Disp Refills   • atorvastatin (LIPITOR) 10 MG tablet [Pharmacy Med Name: ATORVASTATIN 10 MG TABLET] 90 tablet 1     Sig: TAKE 1 TABLET BY MOUTH EVERY DAY      Last office visit with prescribing clinician: 10/19/2022      Next office visit with prescribing clinician: Visit date not found            Mike Diez CMA/LMR  11/10/22, 07:58 EST

## 2023-05-09 ENCOUNTER — OFFICE VISIT (OUTPATIENT)
Dept: FAMILY MEDICINE CLINIC | Facility: CLINIC | Age: 58
End: 2023-05-09
Payer: COMMERCIAL

## 2023-05-09 VITALS
HEART RATE: 68 BPM | WEIGHT: 230 LBS | DIASTOLIC BLOOD PRESSURE: 84 MMHG | SYSTOLIC BLOOD PRESSURE: 148 MMHG | BODY MASS INDEX: 31.18 KG/M2 | OXYGEN SATURATION: 98 % | TEMPERATURE: 97.5 F

## 2023-05-09 DIAGNOSIS — J40 BRONCHITIS: Primary | ICD-10-CM

## 2023-05-09 PROCEDURE — 99214 OFFICE O/P EST MOD 30 MIN: CPT | Performed by: INTERNAL MEDICINE

## 2023-05-09 RX ORDER — FLUTICASONE PROPIONATE 50 MCG
2 SPRAY, SUSPENSION (ML) NASAL DAILY
Qty: 16 G | Refills: 3 | Status: SHIPPED | OUTPATIENT
Start: 2023-05-09

## 2023-05-09 RX ORDER — PREDNISONE 20 MG/1
TABLET ORAL
Qty: 7 TABLET | Refills: 0 | Status: SHIPPED | OUTPATIENT
Start: 2023-05-09 | End: 2023-05-15

## 2023-05-09 RX ORDER — AZITHROMYCIN 250 MG/1
TABLET, FILM COATED ORAL
Qty: 6 TABLET | Refills: 0 | Status: SHIPPED | OUTPATIENT
Start: 2023-05-09

## 2023-05-09 NOTE — PROGRESS NOTES
Tom Pop is a 58 y.o. male.     Chief Complaint   Patient presents with   • Cough     Patient does not want to be tested for covid or flu        History of Present Illness     Patient states to having 2 months of cough and chest congestion.  Trying mucinex and robitussin with no relief.  Seems worse at night and having clear/white sputum and nasal discharge with no blood.  No fever, chills, headache, N, V, CP.  Sometimes with gurgling sounds.  No wheezing or short of breath.    The following portions of the patient's history were reviewed and updated as appropriate: allergies, current medications, past family history, past medical history, past social history, past surgical history and problem list.    Depression Screen:      5/9/2023     2:44 PM   PHQ-2/PHQ-9 Depression Screening   Little Interest or Pleasure in Doing Things 0-->not at all   Feeling Down, Depressed or Hopeless 0-->not at all   PHQ-9: Brief Depression Severity Measure Score 0       Past Medical History:   Diagnosis Date   • Cervical disc disease    • Gout    • Hyperlipidemia    • Hypertension    • Neck pain        Past Surgical History:   Procedure Laterality Date   • APPENDECTOMY     • CERVICAL DISCECTOMY POSTERIOR FUSION WITH BRAIN LAB Left 10/21/2021    Procedure: LEFT CERVICAL 7 AND THORACIC 1 POSTERIOR DISCECTOMY WITH METRIX;  Surgeon: Lennox Bermudez MD;  Location: [facility];  Service: Neurosurgery;  Laterality: Left;   • TONSILLECTOMY     • VASECTOMY         Family History   Problem Relation Age of Onset   • Diabetes Mother    • Heart disease Mother 62   • Diabetes Father    • Heart disease Father 72       Social History     Socioeconomic History   • Marital status:    Tobacco Use   • Smoking status: Never   • Smokeless tobacco: Never   Vaping Use   • Vaping Use: Never used   Substance and Sexual Activity   • Alcohol use: Yes     Alcohol/week: 4.0 standard drinks     Types: 2 Cans of beer, 2 Shots of liquor  per week     Comment: occassionally   • Drug use: No   • Sexual activity: Yes     Partners: Female       Current Outpatient Medications   Medication Sig Dispense Refill   • atorvastatin (LIPITOR) 10 MG tablet TAKE 1 TABLET BY MOUTH EVERY DAY 90 tablet 1   • doxycycline (VIBRAMYCIN) 100 MG capsule Take 1 capsule by mouth 2 (Two) Times a Day. 20 capsule 0   • Loratadine (CLARITIN PO) Take  by mouth.     • azithromycin (Zithromax Z-Juan Diego) 250 MG tablet Take 2 tablets the first day, then 1 tablet daily for 4 days. 6 tablet 0   • fluticasone (FLONASE) 50 MCG/ACT nasal spray 2 sprays into the nostril(s) as directed by provider Daily. 16 g 3   • predniSONE (DELTASONE) 20 MG tablet Take 2 tablets by mouth Daily for 2 days, THEN 1 tablet Daily for 2 days, THEN 0.5 tablets Daily for 2 days. 7 tablet 0     No current facility-administered medications for this visit.       Review of Systems   Constitutional: Negative for activity change, appetite change, fatigue, fever, unexpected weight gain and unexpected weight loss.   HENT: Positive for congestion and rhinorrhea. Negative for nosebleeds, trouble swallowing and voice change.    Eyes: Negative for visual disturbance.   Respiratory: Positive for cough. Negative for chest tightness, shortness of breath and wheezing.    Cardiovascular: Negative for chest pain, palpitations and leg swelling.   Gastrointestinal: Negative for abdominal pain, blood in stool, constipation, diarrhea, nausea, vomiting, GERD and indigestion.   Genitourinary: Negative for dysuria, frequency and hematuria.   Musculoskeletal: Negative for arthralgias, back pain and myalgias.   Skin: Negative for rash and wound.   Neurological: Negative for dizziness, tremors, weakness, light-headedness, numbness, headache and memory problem.   Hematological: Negative for adenopathy. Does not bruise/bleed easily.   Psychiatric/Behavioral: Negative for sleep disturbance and depressed mood. The patient is not nervous/anxious.       Objective   /84 (BP Location: Right arm, Patient Position: Sitting, Cuff Size: Large Adult)   Pulse 68   Temp 97.5 °F (36.4 °C)   Wt 104 kg (230 lb)   SpO2 98%   BMI 31.18 kg/m²     Physical Exam  Vitals and nursing note reviewed.   Constitutional:       General: He is not in acute distress.     Appearance: He is well-developed. He is obese. He is not diaphoretic.   HENT:      Head: Normocephalic and atraumatic.      Right Ear: External ear normal.      Left Ear: External ear normal.      Nose: Nose normal.   Eyes:      Conjunctiva/sclera: Conjunctivae normal.      Pupils: Pupils are equal, round, and reactive to light.   Neck:      Thyroid: No thyromegaly.      Trachea: No tracheal deviation.   Cardiovascular:      Rate and Rhythm: Normal rate and regular rhythm.      Heart sounds: Normal heart sounds. No murmur heard.    No friction rub. No gallop.   Pulmonary:      Effort: Pulmonary effort is normal. No respiratory distress.      Breath sounds: Normal breath sounds.   Abdominal:      General: Bowel sounds are normal.      Palpations: Abdomen is soft. There is no mass.      Tenderness: There is no abdominal tenderness. There is no guarding.   Musculoskeletal:         General: Normal range of motion.      Cervical back: Normal range of motion and neck supple.   Lymphadenopathy:      Cervical: No cervical adenopathy.   Skin:     General: Skin is warm and dry.      Capillary Refill: Capillary refill takes less than 2 seconds.      Findings: No rash.   Neurological:      Mental Status: He is alert and oriented to person, place, and time.      Motor: No abnormal muscle tone.      Deep Tendon Reflexes: Reflexes normal.   Psychiatric:         Behavior: Behavior normal.         Thought Content: Thought content normal.         Judgment: Judgment normal.         No results found for this or any previous visit (from the past 2016 hour(s)).  Assessment & Plan   Diagnoses and all orders for this visit:    1.  Bronchitis (Primary)  -     predniSONE (DELTASONE) 20 MG tablet; Take 2 tablets by mouth Daily for 2 days, THEN 1 tablet Daily for 2 days, THEN 0.5 tablets Daily for 2 days.  Dispense: 7 tablet; Refill: 0  -     azithromycin (Zithromax Z-Juan Diego) 250 MG tablet; Take 2 tablets the first day, then 1 tablet daily for 4 days.  Dispense: 6 tablet; Refill: 0  -     fluticasone (FLONASE) 50 MCG/ACT nasal spray; 2 sprays into the nostril(s) as directed by provider Daily.  Dispense: 16 g; Refill: 3    Patient with more persistent cough likely allergic component but also with some underlying bronchitis treated with the prednisone and Z-Juan Diego and use Flonase.  If he has persisting issues as a follow-up.           · COVID-19 Precautions - Patient was compliant in wearing a mask. When I saw the patient, I used appropriate personal protective equipment (PPE) including mask and eye shield (standard procedure).  Additionally, I used gown and gloves if indicated.  Hand hygiene was completed before and after seeing the patient.  · Dictated utilizing Dragon Dictation

## 2023-05-10 DIAGNOSIS — E78.5 HYPERLIPIDEMIA, UNSPECIFIED HYPERLIPIDEMIA TYPE: ICD-10-CM

## 2023-05-10 RX ORDER — ATORVASTATIN CALCIUM 10 MG/1
TABLET, FILM COATED ORAL
Qty: 90 TABLET | Refills: 1 | Status: SHIPPED | OUTPATIENT
Start: 2023-05-10

## 2023-05-10 NOTE — TELEPHONE ENCOUNTER
LOV             5/9/2023   NOV            5/24/2023   Last RF       11/10/22 #90 rfx1    Rachel Mark, YOHAN/LMR

## 2023-05-24 ENCOUNTER — OFFICE VISIT (OUTPATIENT)
Dept: FAMILY MEDICINE CLINIC | Facility: CLINIC | Age: 58
End: 2023-05-24
Payer: COMMERCIAL

## 2023-05-24 VITALS
SYSTOLIC BLOOD PRESSURE: 150 MMHG | HEIGHT: 72 IN | HEART RATE: 73 BPM | DIASTOLIC BLOOD PRESSURE: 86 MMHG | BODY MASS INDEX: 30.69 KG/M2 | OXYGEN SATURATION: 99 % | WEIGHT: 226.6 LBS

## 2023-05-24 DIAGNOSIS — Z00.00 ANNUAL PHYSICAL EXAM: Primary | ICD-10-CM

## 2023-05-24 DIAGNOSIS — I10 PRIMARY HYPERTENSION: ICD-10-CM

## 2023-05-24 DIAGNOSIS — E78.5 HYPERLIPIDEMIA, UNSPECIFIED HYPERLIPIDEMIA TYPE: ICD-10-CM

## 2023-05-24 DIAGNOSIS — Z12.5 SPECIAL SCREENING, PROSTATE CANCER: ICD-10-CM

## 2023-05-24 PROCEDURE — 99396 PREV VISIT EST AGE 40-64: CPT | Performed by: INTERNAL MEDICINE

## 2023-05-24 RX ORDER — LISINOPRIL 10 MG/1
10 TABLET ORAL DAILY
Qty: 30 TABLET | Refills: 3 | Status: SHIPPED | OUTPATIENT
Start: 2023-05-24
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
## 2023-05-25 LAB
ALBUMIN SERPL-MCNC: 4.8 G/DL (ref 3.5–5.2)
ALBUMIN/GLOB SERPL: 2.1 G/DL
ALP SERPL-CCNC: 60 U/L (ref 39–117)
ALT SERPL-CCNC: 46 U/L (ref 1–41)
AST SERPL-CCNC: 29 U/L (ref 1–40)
BILIRUB SERPL-MCNC: 0.7 MG/DL (ref 0–1.2)
BUN SERPL-MCNC: 10 MG/DL (ref 8–23)
BUN/CREAT SERPL: 13 (ref 7–25)
CALCIUM SERPL-MCNC: 10.1 MG/DL (ref 8.6–10.5)
CHLORIDE SERPL-SCNC: 104 MMOL/L (ref 98–107)
CHOLEST SERPL-MCNC: 163 MG/DL (ref 0–200)
CO2 SERPL-SCNC: 26.8 MMOL/L (ref 22–29)
CREAT SERPL-MCNC: 0.77 MG/DL (ref 0.76–1.27)
EGFRCR SERPLBLD CKD-EPI 2021: 102.5 ML/MIN/1.73
GLOBULIN SER CALC-MCNC: 2.3 GM/DL
GLUCOSE SERPL-MCNC: 94 MG/DL (ref 65–99)
HDLC SERPL-MCNC: 44 MG/DL (ref 40–60)
LDLC SERPL CALC-MCNC: 104 MG/DL (ref 0–100)
POTASSIUM SERPL-SCNC: 4.7 MMOL/L (ref 3.5–5.2)
PROT SERPL-MCNC: 7.1 G/DL (ref 6–8.5)
PSA SERPL-MCNC: 2.88 NG/ML (ref 0–4)
SODIUM SERPL-SCNC: 141 MMOL/L (ref 136–145)
TRIGL SERPL-MCNC: 81 MG/DL (ref 0–150)
VLDLC SERPL CALC-MCNC: 15 MG/DL (ref 5–40)

## 2023-08-28 RX ORDER — LISINOPRIL 10 MG/1
TABLET ORAL
Qty: 90 TABLET | Refills: 1 | Status: SHIPPED | OUTPATIENT
Start: 2023-08-28

## 2023-08-29 ENCOUNTER — OFFICE VISIT (OUTPATIENT)
Dept: FAMILY MEDICINE CLINIC | Facility: CLINIC | Age: 58
End: 2023-08-29
Payer: COMMERCIAL

## 2023-08-29 VITALS
HEART RATE: 59 BPM | OXYGEN SATURATION: 99 % | SYSTOLIC BLOOD PRESSURE: 122 MMHG | DIASTOLIC BLOOD PRESSURE: 68 MMHG | TEMPERATURE: 98.6 F | WEIGHT: 228 LBS | HEIGHT: 72 IN | BODY MASS INDEX: 30.88 KG/M2

## 2023-08-29 DIAGNOSIS — E78.5 HYPERLIPIDEMIA, UNSPECIFIED HYPERLIPIDEMIA TYPE: ICD-10-CM

## 2023-08-29 DIAGNOSIS — J31.0 RHINITIS, CHRONIC: ICD-10-CM

## 2023-08-29 DIAGNOSIS — I10 PRIMARY HYPERTENSION: Primary | ICD-10-CM

## 2023-08-29 PROCEDURE — 99214 OFFICE O/P EST MOD 30 MIN: CPT | Performed by: INTERNAL MEDICINE

## 2023-08-29 RX ORDER — AZELASTINE HYDROCHLORIDE, FLUTICASONE PROPIONATE 137; 50 UG/1; UG/1
2 SPRAY, METERED NASAL DAILY
Qty: 23 G | Refills: 5 | Status: SHIPPED | OUTPATIENT
Start: 2023-08-29

## 2023-08-29 NOTE — PROGRESS NOTES
Tom Pop is a 58 y.o. male.     Chief Complaint   Patient presents with    Hypertension    Hyperlipidemia       History of Present Illness   Follow-up for hypertension.  Currently, has been feeling well and asymptomatic without any headaches, vision changes, cough, chest pain, shortness of breath, swelling, focal neurologic deficit, memory loss or syncope.  Has been taking the medications regularly and adherent with the regimen of lisinopril 10 mg daily.  Denies medication side effects and no significant interval events.      Follow-up for cholesterol.  Currently, has been feeling well without any myalgias, muscle aches, weakness, numbness, chest pain, short of breath or other issues.  Currently, is adherent with medication regimen of atorvastatin 10 mg and denies medication side effects. Is due for lab follow-up.     Continues to have postnasal drainage.  Has been using the flonase and not having issues into the chest but still with tickle and thickness in back of throat.    Has dermatology appointment in 9 days.  Has area on right arm they did biopsy on in past but is tender and some mild discoloration.    The following portions of the patient's history were reviewed and updated as appropriate: allergies, current medications, past family history, past medical history, past social history, past surgical history and problem list.    Depression Screen:      5/9/2023     2:44 PM   PHQ-2/PHQ-9 Depression Screening   Little Interest or Pleasure in Doing Things 0-->not at all   Feeling Down, Depressed or Hopeless 0-->not at all   PHQ-9: Brief Depression Severity Measure Score 0       Past Medical History:   Diagnosis Date    Cervical disc disease     Gout     Hyperlipidemia     Hypertension     Neck pain        Past Surgical History:   Procedure Laterality Date    APPENDECTOMY      CERVICAL DISCECTOMY POSTERIOR FUSION WITH BRAIN LAB Left 10/21/2021    Procedure: LEFT CERVICAL 7 AND THORACIC 1 POSTERIOR  DISCECTOMY WITH METRIX;  Surgeon: Lennox Bermudez MD;  Location: [facility];  Service: Neurosurgery;  Laterality: Left;    TONSILLECTOMY      VASECTOMY         Family History   Problem Relation Age of Onset    Diabetes Mother     Heart disease Mother 62    Diabetes Father     Heart disease Father 72       Social History     Socioeconomic History    Marital status:    Tobacco Use    Smoking status: Never     Passive exposure: Never    Smokeless tobacco: Never   Vaping Use    Vaping Use: Never used   Substance and Sexual Activity    Alcohol use: Yes     Alcohol/week: 4.0 standard drinks     Types: 2 Cans of beer, 2 Shots of liquor per week     Comment: occassionally    Drug use: No    Sexual activity: Yes     Partners: Female       Current Outpatient Medications   Medication Sig Dispense Refill    atorvastatin (LIPITOR) 10 MG tablet TAKE 1 TABLET BY MOUTH EVERY DAY 90 tablet 1    lisinopril (PRINIVIL,ZESTRIL) 10 MG tablet TAKE 1 TABLET BY MOUTH EVERY DAY 90 tablet 1    Loratadine (CLARITIN PO) Take  by mouth.      Azelastine-Fluticasone 137-50 MCG/ACT suspension 2 sprays into the nostril(s) as directed by provider Daily. 23 g 5     No current facility-administered medications for this visit.       Review of Systems   Constitutional:  Negative for activity change, appetite change, fatigue, fever, unexpected weight gain and unexpected weight loss.   HENT:  Positive for postnasal drip and rhinorrhea. Negative for nosebleeds, trouble swallowing and voice change.    Eyes:  Negative for visual disturbance.   Respiratory:  Negative for cough, chest tightness, shortness of breath and wheezing.    Cardiovascular:  Negative for chest pain, palpitations and leg swelling.   Gastrointestinal:  Negative for abdominal pain, blood in stool, constipation, diarrhea, nausea, vomiting, GERD and indigestion.   Genitourinary:  Negative for dysuria, frequency and hematuria.   Musculoskeletal:  Negative for arthralgias, back  "pain and myalgias.   Skin:  Positive for skin lesions. Negative for rash and wound.   Neurological:  Negative for dizziness, tremors, weakness, light-headedness, numbness, headache and memory problem.   Hematological:  Negative for adenopathy. Does not bruise/bleed easily.   Psychiatric/Behavioral:  Negative for sleep disturbance and depressed mood. The patient is not nervous/anxious.      Objective   /68 (BP Location: Left arm, Patient Position: Sitting, Cuff Size: Large Adult)   Pulse 59   Temp 98.6 øF (37 øC) (Temporal)   Ht 182.9 cm (72.01\")   Wt 103 kg (228 lb)   SpO2 99%   BMI 30.92 kg/mý     Physical Exam  Vitals and nursing note reviewed.   Constitutional:       General: He is not in acute distress.     Appearance: He is well-developed. He is obese. He is not diaphoretic.   HENT:      Head: Normocephalic and atraumatic.      Right Ear: External ear normal.      Left Ear: External ear normal.      Nose: Nose normal.   Eyes:      Conjunctiva/sclera: Conjunctivae normal.      Pupils: Pupils are equal, round, and reactive to light.   Neck:      Thyroid: No thyromegaly.      Trachea: No tracheal deviation.   Cardiovascular:      Rate and Rhythm: Normal rate and regular rhythm.      Heart sounds: Normal heart sounds. No murmur heard.    No friction rub. No gallop.   Pulmonary:      Effort: Pulmonary effort is normal. No respiratory distress.      Breath sounds: Normal breath sounds.   Abdominal:      General: Bowel sounds are normal.      Palpations: Abdomen is soft. There is no mass.      Tenderness: There is no abdominal tenderness. There is no guarding.   Musculoskeletal:         General: Normal range of motion.      Cervical back: Normal range of motion and neck supple.   Lymphadenopathy:      Cervical: No cervical adenopathy.   Skin:     General: Skin is warm and dry.      Capillary Refill: Capillary refill takes less than 2 seconds.      Findings: No rash.      Comments: Right biceps with tender " area around the past biopsy site with firmness.   Neurological:      Mental Status: He is alert and oriented to person, place, and time.      Motor: No abnormal muscle tone.      Deep Tendon Reflexes: Reflexes normal.   Psychiatric:         Behavior: Behavior normal.         Thought Content: Thought content normal.         Judgment: Judgment normal.       No results found for this or any previous visit (from the past 2016 hour(s)).  Assessment & Plan   Diagnoses and all orders for this visit:    1. Primary hypertension (Primary)    2. Hyperlipidemia, unspecified hyperlipidemia type    3. Rhinitis, chronic  -     Azelastine-Fluticasone 137-50 MCG/ACT suspension; 2 sprays into the nostril(s) as directed by provider Daily.  Dispense: 23 g; Refill: 5    Hypertension is currently very well controlled.  Continue the lisinopril unchanged.  Follow-up in 6 months.  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  Therefore, we will add azelastine in with his fluticasone nasal spray and monitor closely while continuing his Claritin.  Last lipid panel was wonderful no changes are needed at this time continuing with his current atorvastatin 10 mg daily and will recheck in 6 months.           COVID-19 Precautions - Patient was compliant in wearing a mask. When I saw the patient, I used appropriate personal protective equipment (PPE) including mask and eye shield (standard procedure).  Additionally, I used gown and gloves if indicated.  Hand hygiene was completed before and after seeing the patient.  Dictated utilizing Dragon Dictation

## 2023-09-14 ENCOUNTER — OFFICE VISIT (OUTPATIENT)
Dept: SURGERY | Facility: CLINIC | Age: 58
End: 2023-09-14
Payer: COMMERCIAL

## 2023-09-14 VITALS — WEIGHT: 226 LBS | HEIGHT: 72 IN | BODY MASS INDEX: 30.61 KG/M2

## 2023-09-14 DIAGNOSIS — D18.00 GLOMUS TUMOR: Primary | ICD-10-CM

## 2023-09-14 PROCEDURE — 99203 OFFICE O/P NEW LOW 30 MIN: CPT | Performed by: SURGERY

## 2023-09-15 NOTE — H&P (VIEW-ONLY)
General Surgery  Initial Office Visit    CC: Right upper arm glomus tumor    HPI: The patient is a pleasant 58 y.o. 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  He had a red pigmented skin lesion of the right upper arm for over 20+ years in a constant duration.  It was asymptomatic for many years but in the last 2 years has become very painful even to light touch.  When anything grazes it, he experiences severe burning pain.  He saw Kayla De La Rosa with [facility] who performed a punch biopsy on the skin lesion which returned as a benign glomus tumor.  He was then referred to see me for definitive surgical excision.    Past Medical History:   Hypertension  Hyperlipidemia  Gout    Past Surgical History:   Cervical spine fusion  Appendectomy  Vasectomy  Tonsillectomy    Medications:   Atorvastatin 10 mg daily  Azelastine nasal spray daily  Lisinopril 10 mg daily  Loratadine once daily    Allergies: Allopurinol (peeling of his hands and feet)    Family History: Mother and father both had diabetes and coronary artery disease    Social History: , works for [employer], non-smoker, social alcohol use (4 drinks weekly)    ROS:   Constitutional: Negative for fevers or chills  HENT: Negative for hearing loss or runny nose  Eyes: Negative for vision changes or scleral icterus  Respiratory: Negative for cough or shortness of breath  Cardiovascular: Negative for chest pain or heart palpitations  Gastrointestinal: Negative for abdominal distension, nausea, vomiting, constipation, melena, or hematochezia  Genitourinary: Negative for hematuria or dysuria  Musculoskeletal: Negative for joint swelling or gait instability  Neurologic: Negative for tremors or seizures  Psychiatric: Negative for suicidal ideations or agitation  All other systems reviewed and negative    Physical Exam:  Height: 182 cm  Weight: 103 kg  BMI: 30.6  General: No  acute distress, well-nourished & well-developed  HEAD: normocephalic, atraumatic  EYES: normal conjunctiva, sclera anicteric  EARS: grossly normal hearing  NECK: supple, no thyromegaly  CARDIOVASCULAR: regular rate and rhythm  RESPIRATORY: clear to auscultation bilaterally  GASTROINTESTINAL: soft, nontender, non-distended  MUSCULOSKELETAL: normal gait and station. No gross extremity abnormalities  PSYCHIATRIC: oriented x3, normal mood and affect  SKIN: Hyperpigmented subcutaneous mass of the right upper arm measuring about 1 cm with purple discoloration of the subcutaneous fat and tenderness out of proportion to what I would expect when the lesion is palpated    ASSESSMENT & PLAN  Mr. 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  I discussed his punch biopsy results with his dermatologist, Kayla De La Rosa.  I would recommend scheduling him for surgical excision of the glomus tumor in the operating room under MAC anesthesia.  Given the extremely sensitive nature of his particular glomus tumor, I do not think he would be able to tolerate an excision under local anesthesia here in the office because his pain to palpation is fairly severe.  He understands this would be an outpatient procedure and he would likely be able to return to work the following day.  We discussed the other risks of the procedure which include bleeding, wound infection, etc. and he has consented to proceed.    Rita Mims MD  General, Robotic, and Endoscopic Surgery  [facility]    [address], [address]  [place] [address]  P: [phone]  F: [phone]

## 2023-09-15 NOTE — PROGRESS NOTES
General Surgery  Initial Office Visit    CC: Right upper arm glomus tumor    HPI: The patient is a pleasant 58 y.o. 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  He had a red pigmented skin lesion of the right upper arm for over 20+ years in a constant duration.  It was asymptomatic for many years but in the last 2 years has become very painful even to light touch.  When anything grazes it, he experiences severe burning pain.  He saw Kayla De La Rosa with [facility] who performed a punch biopsy on the skin lesion which returned as a benign glomus tumor.  He was then referred to see me for definitive surgical excision.    Past Medical History:   Hypertension  Hyperlipidemia  Gout    Past Surgical History:   Cervical spine fusion  Appendectomy  Vasectomy  Tonsillectomy    Medications:   Atorvastatin 10 mg daily  Azelastine nasal spray daily  Lisinopril 10 mg daily  Loratadine once daily    Allergies: Allopurinol (peeling of his hands and feet)    Family History: Mother and father both had diabetes and coronary artery disease    Social History: , works for [employer], non-smoker, social alcohol use (4 drinks weekly)    ROS:   Constitutional: Negative for fevers or chills  HENT: Negative for hearing loss or runny nose  Eyes: Negative for vision changes or scleral icterus  Respiratory: Negative for cough or shortness of breath  Cardiovascular: Negative for chest pain or heart palpitations  Gastrointestinal: Negative for abdominal distension, nausea, vomiting, constipation, melena, or hematochezia  Genitourinary: Negative for hematuria or dysuria  Musculoskeletal: Negative for joint swelling or gait instability  Neurologic: Negative for tremors or seizures  Psychiatric: Negative for suicidal ideations or agitation  All other systems reviewed and negative    Physical Exam:  Height: 182 cm  Weight: 103 kg  BMI: 30.6  General: No  acute distress, well-nourished & well-developed  HEAD: normocephalic, atraumatic  EYES: normal conjunctiva, sclera anicteric  EARS: grossly normal hearing  NECK: supple, no thyromegaly  CARDIOVASCULAR: regular rate and rhythm  RESPIRATORY: clear to auscultation bilaterally  GASTROINTESTINAL: soft, nontender, non-distended  MUSCULOSKELETAL: normal gait and station. No gross extremity abnormalities  PSYCHIATRIC: oriented x3, normal mood and affect  SKIN: Hyperpigmented subcutaneous mass of the right upper arm measuring about 1 cm with purple discoloration of the subcutaneous fat and tenderness out of proportion to what I would expect when the lesion is palpated    ASSESSMENT & PLAN  Mr. 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  I discussed his punch biopsy results with his dermatologist, Kayla De La Rosa.  I would recommend scheduling him for surgical excision of the glomus tumor in the operating room under MAC anesthesia.  Given the extremely sensitive nature of his particular glomus tumor, I do not think he would be able to tolerate an excision under local anesthesia here in the office because his pain to palpation is fairly severe.  He understands this would be an outpatient procedure and he would likely be able to return to work the following day.  We discussed the other risks of the procedure which include bleeding, wound infection, etc. and he has consented to proceed.    Rita Mims MD  General, Robotic, and Endoscopic Surgery  [facility]    [address], [address]  [place] [address]  P: [phone]  F: [phone]

## 2023-09-29 ENCOUNTER — PRE-ADMISSION TESTING (OUTPATIENT)
Dept: PREADMISSION TESTING | Facility: HOSPITAL | Age: 58
End: 2023-09-29
Payer: COMMERCIAL

## 2023-09-29 VITALS
DIASTOLIC BLOOD PRESSURE: 76 MMHG | SYSTOLIC BLOOD PRESSURE: 147 MMHG | OXYGEN SATURATION: 98 % | HEIGHT: 72 IN | WEIGHT: 226.2 LBS | HEART RATE: 61 BPM | RESPIRATION RATE: 16 BRPM | TEMPERATURE: 97.7 F | BODY MASS INDEX: 30.64 KG/M2

## 2023-09-29 DIAGNOSIS — D18.00 GLOMUS TUMOR: ICD-10-CM

## 2023-09-29 LAB
ANION GAP SERPL CALCULATED.3IONS-SCNC: 7 MMOL/L (ref 5–15)
BUN SERPL-MCNC: 15 MG/DL (ref 8–23)
BUN/CREAT SERPL: 17.6 (ref 7–25)
CALCIUM SPEC-SCNC: 9.1 MG/DL (ref 8.6–10.5)
CHLORIDE SERPL-SCNC: 105 MMOL/L (ref 98–107)
CO2 SERPL-SCNC: 27 MMOL/L (ref 22–29)
CREAT SERPL-MCNC: 0.85 MG/DL (ref 0.76–1.27)
DEPRECATED RDW RBC AUTO: 45.5 FL (ref 37–54)
EGFRCR SERPLBLD CKD-EPI 2021: 99.5 ML/MIN/1.73
ERYTHROCYTE [DISTWIDTH] IN BLOOD BY AUTOMATED COUNT: 12.7 % (ref 12.3–15.4)
GLUCOSE SERPL-MCNC: 151 MG/DL (ref 65–99)
HCT VFR BLD AUTO: 45.7 % (ref 37.5–51)
HGB BLD-MCNC: 15.5 G/DL (ref 13–17.7)
MCH RBC QN AUTO: 33.5 PG (ref 26.6–33)
MCHC RBC AUTO-ENTMCNC: 33.9 G/DL (ref 31.5–35.7)
MCV RBC AUTO: 98.7 FL (ref 79–97)
PLATELET # BLD AUTO: 214 10*3/MM3 (ref 140–450)
PMV BLD AUTO: 10.5 FL (ref 6–12)
POTASSIUM SERPL-SCNC: 5.1 MMOL/L (ref 3.5–5.2)
RBC # BLD AUTO: 4.63 10*6/MM3 (ref 4.14–5.8)
SODIUM SERPL-SCNC: 139 MMOL/L (ref 136–145)
WBC NRBC COR # BLD: 4.78 10*3/MM3 (ref 3.4–10.8)

## 2023-09-29 PROCEDURE — 85027 COMPLETE CBC AUTOMATED: CPT

## 2023-09-29 PROCEDURE — 36415 COLL VENOUS BLD VENIPUNCTURE: CPT

## 2023-09-29 PROCEDURE — 80048 BASIC METABOLIC PNL TOTAL CA: CPT

## 2023-09-29 NOTE — DISCHARGE INSTRUCTIONS
Take the following medications the morning of surgery:  NONE    ARRIVAL TIME 9:00 AM    If you are on prescription narcotic pain medication to control your pain you may also take that medication the morning of surgery.    General Instructions:  Do not eat solid food after midnight the night before surgery.  You may drink clear liquids day of surgery but must stop at least one hour before your hospital arrival time.  It is beneficial for you to have a clear drink that contains carbohydrates the day of surgery.  We suggest a 12 to 20 ounce bottle of Gatorade or Powerade for non-diabetic patients or a 12 to 20 ounce bottle of G2 or Powerade Zero for diabetic patients. (Pediatric patients, are not advised to drink a 12 to 20 ounce carbohydrate drink)    Clear liquids are liquids you can see through.  Nothing red in color.     Plain water                               Sports drinks  Sodas                                   Gelatin (Jell-O)  Fruit juices without pulp such as white grape juice and apple juice  Popsicles that contain no fruit or yogurt  Tea or coffee (no cream or milk added)  Gatorade / Powerade  G2 / Powerade Zero    Infants may have breast milk up to four hours before surgery.  Infants drinking formula may drink formula up to six hours before surgery.   Patients who avoid smoking, chewing tobacco and alcohol for 4 weeks prior to surgery have a reduced risk of post-operative complications.  Quit smoking as many days before surgery as you can.  Do not smoke, use chewing tobacco or drink alcohol the day of surgery.   If applicable bring your C-PAP/ BI-PAP machine in with you to preop day of surgery.  Bring any papers given to you in the doctor’s office.  Wear clean comfortable clothes.  Do not wear contact lenses, false eyelashes or make-up.  Bring a case for your glasses.   Bring crutches or walker if applicable.  Remove all piercings.  Leave jewelry and any other valuables at home.  Hair extensions with  metal clips must be removed prior to surgery.  The Pre-Admission Testing nurse will instruct you to bring medications if unable to obtain an accurate list in Pre-Admission Testing.        If you were given a blood bank ID arm band remember to bring it with you the day of surgery.    Preventing a Surgical Site Infection:  For 2 to 3 days before surgery, avoid shaving with a razor because the razor can irritate skin and make it easier to develop an infection.    Any areas of open skin can increase the risk of a post-operative wound infection by allowing bacteria to enter and travel throughout the body.  Notify your surgeon if you have any skin wounds / rashes even if it is not near the expected surgical site.  The area will need assessed to determine if surgery should be delayed until it is healed.  The night prior to surgery shower using a fresh bar of anti-bacterial soap (such as Dial) and clean washcloth.  Sleep in a clean bed with clean clothing.  Do not allow pets to sleep with you.  Shower on the morning of surgery using a fresh bar of anti-bacterial soap (such as Dial) and clean washcloth.  Dry with a clean towel and dress in clean clothing.  Ask your surgeon if you will be receiving antibiotics prior to surgery.  Make sure you, your family, and all healthcare providers clean their hands with soap and water or an alcohol based hand  before caring for you or your wound.    Day of surgery:10/6/2023   Your arrival time is approximately two hours before your scheduled surgery time.  Upon arrival, a Pre-op nurse and Anesthesiologist will review your health history, obtain vital signs, and answer questions you may have.  The only belongings needed at this time will be a list of your home medications and if applicable your C-PAP/BI-PAP machine.  A Pre-op nurse will start an IV and you may receive medication in preparation for surgery, including something to help you relax.     Please be aware that surgery  does come with discomfort.  We want to make every effort to control your discomfort so please discuss any uncontrolled symptoms with your nurse.   Your doctor will most likely have prescribed pain medications.      If you are going home after surgery you will receive individualized written care instructions before being discharged.  A responsible adult must drive you to and from the hospital on the day of your surgery and stay with you for 24 hours.  Discharge prescriptions can be filled by the hospital pharmacy during regular pharmacy hours.  If you are having surgery late in the day/evening your prescription may be e-prescribed to your pharmacy.  Please verify your pharmacy hours or chose a 24 hour pharmacy to avoid not having access to your prescription because your pharmacy has closed for the day.    If you are staying overnight following surgery, you will be transported to your hospital room following the recovery period.  [facility] has all private rooms.    If you have any questions please call Pre-Admission Testing at [phone].  Deductibles and co-payments are collected on the day of service. Please be prepared to pay the required co-pay, deductible or deposit on the day of service as defined by your plan.    Call your surgeon immediately if you experience any of the following symptoms:  Sore Throat  Shortness of Breath or difficulty breathing  Cough  Chills  Body soreness or muscle pain  Headache  Fever  New loss of taste or smell  Do not arrive for your surgery ill.  Your procedure will need to be rescheduled to another time.  You will need to call your physician before the day of surgery to avoid any unnecessary exposure to hospital staff as well as other patients.      CHLORHEXIDINE CLOTH INSTRUCTIONS  The morning of surgery follow these instructions using the Chlorhexidine cloths you've been given.  These steps reduce bacteria on the body.  Do not use the cloths near your eyes, ears  mouth, genitalia or on open wounds.  Throw the cloths away after use but do not try to flush them down a toilet.      Open and remove one cloth at a time from the package.    Leave the cloth unfolded and begin the bathing.  Massage the skin with the cloths using gentle pressure to remove bacteria.  Do not scrub harshly.   Follow the steps below with one 2% CHG cloth per area (6 total cloths).  One cloth for neck, shoulders and chest.  One cloth for both arms, hands, fingers and underarms (do underarms last).  One cloth for the abdomen followed by groin.  One cloth for right leg and foot including between the toes.  One cloth for left leg and foot including between the toes.  The last cloth is to be used for the back of the neck, back and buttocks.    Allow the CHG to air dry 3 minutes on the skin which will give it time to work and decrease the chance of irritation.  The skin may feel sticky until it is dry.  Do not rinse with water or any other liquid or you will lose the beneficial effects of the CHG.  If mild skin irritation occurs, do rinse the skin to remove the CHG.  Report this to the nurse at time of admission.  Do not apply lotions, creams, ointments, deodorants or perfumes after using the clothes. Dress in clean clothes before coming to the hospital.

## 2023-10-06 ENCOUNTER — ANESTHESIA EVENT (OUTPATIENT)
Dept: PERIOP | Facility: HOSPITAL | Age: 58
End: 2023-10-06
Payer: COMMERCIAL

## 2023-10-06 ENCOUNTER — HOSPITAL ENCOUNTER (OUTPATIENT)
Facility: HOSPITAL | Age: 58
Setting detail: HOSPITAL OUTPATIENT SURGERY
Discharge: HOME | End: 2023-10-06
Attending: SURGERY | Admitting: SURGERY
Payer: COMMERCIAL

## 2023-10-06 ENCOUNTER — ANESTHESIA (OUTPATIENT)
Dept: PERIOP | Facility: HOSPITAL | Age: 58
End: 2023-10-06
Payer: COMMERCIAL

## 2023-10-06 VITALS
HEART RATE: 60 BPM | SYSTOLIC BLOOD PRESSURE: 140 MMHG | DIASTOLIC BLOOD PRESSURE: 94 MMHG | RESPIRATION RATE: 16 BRPM | OXYGEN SATURATION: 100 % | TEMPERATURE: 97.4 F

## 2023-10-06 DIAGNOSIS — D18.00 GLOMUS TUMOR: ICD-10-CM

## 2023-10-06 PROCEDURE — 25010000002 CEFAZOLIN IN DEXTROSE 2-4 GM/100ML-% SOLUTION: Performed by: SURGERY

## 2023-10-06 PROCEDURE — 25010000002 FENTANYL CITRATE (PF) 100 MCG/2ML SOLUTION: Performed by: NURSE ANESTHETIST, CERTIFIED REGISTERED

## 2023-10-06 PROCEDURE — 24075 EXC ARM/ELBOW LES SC < 3 CM: CPT | Performed by: SURGERY

## 2023-10-06 PROCEDURE — 88341 IMHCHEM/IMCYTCHM EA ADD ANTB: CPT | Performed by: SURGERY

## 2023-10-06 PROCEDURE — 25010000002 PROPOFOL 10 MG/ML EMULSION: Performed by: NURSE ANESTHETIST, CERTIFIED REGISTERED

## 2023-10-06 PROCEDURE — 25810000003 LACTATED RINGERS PER 1000 ML: Performed by: ANESTHESIOLOGY

## 2023-10-06 PROCEDURE — 88342 IMHCHEM/IMCYTCHM 1ST ANTB: CPT | Performed by: SURGERY

## 2023-10-06 RX ORDER — HYDROCODONE BITARTRATE AND ACETAMINOPHEN 5; 325 MG/1; MG/1
1 TABLET ORAL ONCE AS NEEDED
Status: DISCONTINUED | OUTPATIENT
Start: 2023-10-06 | End: 2023-10-06 | Stop reason: HOSPADM

## 2023-10-06 RX ORDER — EPHEDRINE SULFATE 50 MG/ML
5 INJECTION, SOLUTION INTRAVENOUS ONCE AS NEEDED
Status: DISCONTINUED | OUTPATIENT
Start: 2023-10-06 | End: 2023-10-06 | Stop reason: HOSPADM

## 2023-10-06 RX ORDER — HYDROMORPHONE HYDROCHLORIDE 1 MG/ML
0.5 INJECTION, SOLUTION INTRAMUSCULAR; INTRAVENOUS; SUBCUTANEOUS
Status: DISCONTINUED | OUTPATIENT
Start: 2023-10-06 | End: 2023-10-06 | Stop reason: HOSPADM

## 2023-10-06 RX ORDER — DROPERIDOL 2.5 MG/ML
0.62 INJECTION, SOLUTION INTRAMUSCULAR; INTRAVENOUS
Status: DISCONTINUED | OUTPATIENT
Start: 2023-10-06 | End: 2023-10-06 | Stop reason: HOSPADM

## 2023-10-06 RX ORDER — FAMOTIDINE 10 MG/ML
20 INJECTION, SOLUTION INTRAVENOUS ONCE
Status: COMPLETED | OUTPATIENT
Start: 2023-10-06 | End: 2023-10-06

## 2023-10-06 RX ORDER — DIPHENHYDRAMINE HYDROCHLORIDE 50 MG/ML
12.5 INJECTION INTRAMUSCULAR; INTRAVENOUS
Status: DISCONTINUED | OUTPATIENT
Start: 2023-10-06 | End: 2023-10-06 | Stop reason: HOSPADM

## 2023-10-06 RX ORDER — FENTANYL CITRATE 50 UG/ML
50 INJECTION, SOLUTION INTRAMUSCULAR; INTRAVENOUS
Status: DISCONTINUED | OUTPATIENT
Start: 2023-10-06 | End: 2023-10-06 | Stop reason: HOSPADM

## 2023-10-06 RX ORDER — MAGNESIUM HYDROXIDE 1200 MG/15ML
LIQUID ORAL AS NEEDED
Status: DISCONTINUED | OUTPATIENT
Start: 2023-10-06 | End: 2023-10-06 | Stop reason: HOSPADM

## 2023-10-06 RX ORDER — HYDRALAZINE HYDROCHLORIDE 20 MG/ML
5 INJECTION INTRAMUSCULAR; INTRAVENOUS
Status: DISCONTINUED | OUTPATIENT
Start: 2023-10-06 | End: 2023-10-06 | Stop reason: HOSPADM

## 2023-10-06 RX ORDER — PROMETHAZINE HYDROCHLORIDE 25 MG/1
25 SUPPOSITORY RECTAL ONCE AS NEEDED
Status: DISCONTINUED | OUTPATIENT
Start: 2023-10-06 | End: 2023-10-06 | Stop reason: HOSPADM

## 2023-10-06 RX ORDER — PROMETHAZINE HYDROCHLORIDE 25 MG/1
25 TABLET ORAL ONCE AS NEEDED
Status: DISCONTINUED | OUTPATIENT
Start: 2023-10-06 | End: 2023-10-06 | Stop reason: HOSPADM

## 2023-10-06 RX ORDER — ACETAMINOPHEN 500 MG
1000 TABLET ORAL ONCE
Status: COMPLETED | OUTPATIENT
Start: 2023-10-06 | End: 2023-10-06

## 2023-10-06 RX ORDER — PROPOFOL 10 MG/ML
VIAL (ML) INTRAVENOUS AS NEEDED
Status: DISCONTINUED | OUTPATIENT
Start: 2023-10-06 | End: 2023-10-06 | Stop reason: SURG

## 2023-10-06 RX ORDER — SODIUM CHLORIDE 0.9 % (FLUSH) 0.9 %
3 SYRINGE (ML) INJECTION EVERY 12 HOURS SCHEDULED
Status: DISCONTINUED | OUTPATIENT
Start: 2023-10-06 | End: 2023-10-06 | Stop reason: HOSPADM

## 2023-10-06 RX ORDER — NALOXONE HCL 0.4 MG/ML
0.2 VIAL (ML) INJECTION AS NEEDED
Status: DISCONTINUED | OUTPATIENT
Start: 2023-10-06 | End: 2023-10-06 | Stop reason: HOSPADM

## 2023-10-06 RX ORDER — LIDOCAINE HYDROCHLORIDE 20 MG/ML
INJECTION, SOLUTION INFILTRATION; PERINEURAL AS NEEDED
Status: DISCONTINUED | OUTPATIENT
Start: 2023-10-06 | End: 2023-10-06 | Stop reason: SURG

## 2023-10-06 RX ORDER — BUPIVACAINE HYDROCHLORIDE AND EPINEPHRINE 5; 5 MG/ML; UG/ML
INJECTION, SOLUTION EPIDURAL; INTRACAUDAL; PERINEURAL AS NEEDED
Status: DISCONTINUED | OUTPATIENT
Start: 2023-10-06 | End: 2023-10-06 | Stop reason: HOSPADM

## 2023-10-06 RX ORDER — ONDANSETRON 2 MG/ML
4 INJECTION INTRAMUSCULAR; INTRAVENOUS ONCE AS NEEDED
Status: DISCONTINUED | OUTPATIENT
Start: 2023-10-06 | End: 2023-10-06 | Stop reason: HOSPADM

## 2023-10-06 RX ORDER — MIDAZOLAM HYDROCHLORIDE 1 MG/ML
1 INJECTION INTRAMUSCULAR; INTRAVENOUS
Status: DISCONTINUED | OUTPATIENT
Start: 2023-10-06 | End: 2023-10-06 | Stop reason: HOSPADM

## 2023-10-06 RX ORDER — OXYCODONE AND ACETAMINOPHEN 7.5; 325 MG/1; MG/1
1 TABLET ORAL EVERY 4 HOURS PRN
Status: DISCONTINUED | OUTPATIENT
Start: 2023-10-06 | End: 2023-10-06 | Stop reason: HOSPADM

## 2023-10-06 RX ORDER — SODIUM CHLORIDE 0.9 % (FLUSH) 0.9 %
3-10 SYRINGE (ML) INJECTION AS NEEDED
Status: DISCONTINUED | OUTPATIENT
Start: 2023-10-06 | End: 2023-10-06 | Stop reason: HOSPADM

## 2023-10-06 RX ORDER — LABETALOL HYDROCHLORIDE 5 MG/ML
5 INJECTION, SOLUTION INTRAVENOUS
Status: DISCONTINUED | OUTPATIENT
Start: 2023-10-06 | End: 2023-10-06 | Stop reason: HOSPADM

## 2023-10-06 RX ORDER — SODIUM CHLORIDE, SODIUM LACTATE, POTASSIUM CHLORIDE, CALCIUM CHLORIDE 600; 310; 30; 20 MG/100ML; MG/100ML; MG/100ML; MG/100ML
9 INJECTION, SOLUTION INTRAVENOUS CONTINUOUS
Status: DISCONTINUED | OUTPATIENT
Start: 2023-10-06 | End: 2023-10-06 | Stop reason: HOSPADM

## 2023-10-06 RX ORDER — FLUMAZENIL 0.1 MG/ML
0.2 INJECTION INTRAVENOUS AS NEEDED
Status: DISCONTINUED | OUTPATIENT
Start: 2023-10-06 | End: 2023-10-06 | Stop reason: HOSPADM

## 2023-10-06 RX ORDER — IPRATROPIUM BROMIDE AND ALBUTEROL SULFATE 2.5; .5 MG/3ML; MG/3ML
3 SOLUTION RESPIRATORY (INHALATION) ONCE AS NEEDED
Status: DISCONTINUED | OUTPATIENT
Start: 2023-10-06 | End: 2023-10-06 | Stop reason: HOSPADM

## 2023-10-06 RX ORDER — CEFAZOLIN SODIUM 2 G/100ML
2000 INJECTION, SOLUTION INTRAVENOUS ONCE
Status: COMPLETED | OUTPATIENT
Start: 2023-10-06 | End: 2023-10-06

## 2023-10-06 RX ORDER — FENTANYL CITRATE 50 UG/ML
INJECTION, SOLUTION INTRAMUSCULAR; INTRAVENOUS AS NEEDED
Status: DISCONTINUED | OUTPATIENT
Start: 2023-10-06 | End: 2023-10-06 | Stop reason: SURG

## 2023-10-06 RX ADMIN — ACETAMINOPHEN 1000 MG: 500 TABLET ORAL at 09:22

## 2023-10-06 RX ADMIN — FENTANYL CITRATE 25 MCG: 50 INJECTION, SOLUTION INTRAMUSCULAR; INTRAVENOUS at 11:40

## 2023-10-06 RX ADMIN — PROPOFOL 80 MG: 10 INJECTION, EMULSION INTRAVENOUS at 11:23

## 2023-10-06 RX ADMIN — Medication 3 ML: at 10:13

## 2023-10-06 RX ADMIN — SODIUM CHLORIDE, POTASSIUM CHLORIDE, SODIUM LACTATE AND CALCIUM CHLORIDE 9 ML/HR: 600; 310; 30; 20 INJECTION, SOLUTION INTRAVENOUS at 10:12

## 2023-10-06 RX ADMIN — PROPOFOL 180 MCG/KG/MIN: 10 INJECTION, EMULSION INTRAVENOUS at 11:23

## 2023-10-06 RX ADMIN — FAMOTIDINE 20 MG: 10 INJECTION INTRAVENOUS at 10:12

## 2023-10-06 RX ADMIN — CEFAZOLIN SODIUM 2000 MG: 2 INJECTION, SOLUTION INTRAVENOUS at 11:13

## 2023-10-06 RX ADMIN — LIDOCAINE HYDROCHLORIDE 60 MG: 20 INJECTION, SOLUTION INFILTRATION; PERINEURAL at 11:23

## 2023-10-06 RX ADMIN — FENTANYL CITRATE 25 MCG: 50 INJECTION, SOLUTION INTRAMUSCULAR; INTRAVENOUS at 11:35

## 2023-10-06 RX ADMIN — PROPOFOL 50 MG: 10 INJECTION, EMULSION INTRAVENOUS at 11:30

## 2023-10-06 NOTE — ANESTHESIA POSTPROCEDURE EVALUATION
Patient: Orion Pop    Procedure Summary       Date: 10/06/23 Room / Location:  [facility]  [facility]    Anesthesia Start: 1115 Anesthesia Stop: 1154    Procedure: Excision right upper arm glomus tumor (Right: Arm Upper) Diagnosis:       Glomus tumor      (Glomus tumor [D18.00])    Surgeons: Rita Mims MD Provider: Cody Duran MD    Anesthesia Type: MAC ASA Status: 2            Anesthesia Type: MAC    Vitals  Vitals Value Taken Time   /86 10/06/23 1212   Temp     Pulse 50 10/06/23 1221   Resp 16 10/06/23 1201   SpO2 100 % 10/06/23 1221   Vitals shown include unvalidated device data.        Post Anesthesia Care and Evaluation    Patient location during evaluation: bedside  Patient participation: complete - patient participated  Level of consciousness: awake  Pain management: adequate    Airway patency: patent  Anesthetic complications: No anesthetic complications    Cardiovascular status: acceptable  Respiratory status: acceptable  Hydration status: acceptable    Comments: */86   Pulse 65   Temp 36.3 °C (97.4 °F)   Resp 16   SpO2 98%

## 2023-10-06 NOTE — OP NOTE
Operative Note :  Rita Mims MD      Orion Pop  1965    Procedure Date: 10/06/23    Pre-op Diagnosis:  Glomus tumor [D18.00]    Post-Operative Diagnosis:  Glomus tumor [D18.00]    Procedure:   Excision right upper arm glomus tumor    Surgeon: Rita Mims MD    Assistant: Maisha Vann CSA (Maisha was responsible for suctioning, retracting, suturing of all surgical incisions, and application of sterile dressings at the completion of the case)    Anesthesia:  MAC (monitored anesthetic care)    Estimated Blood Loss: minimal    Specimens: Right arm glomus tumor    Complications: None    Indications:  The patient is a 58-year-old gentleman who has a very sensitive, painful, enlarging subcuticular mass of the right upper arm that was biopsied recently by his dermatologist and returned with pathology results showing a glomus tumor.  I recommended we proceed with surgical excision of this, but he would not be able to tolerate local anesthetic wide-awake given the extreme sensitivity of this tumor.  I recommended performing the surgery in the operating room under MAC anesthesia today and he has consented to proceed.    Findings: 1 cm subcutaneous glomus tumor of right upper arm removed    Description of procedure:  The patient was brought to the operating room placed on the OR table in supine position.  Continue propofol anesthesia was administered and a surgical timeout completed.  The right upper arm was prepped and draped in a sterile fashion.  The previously marked glomus tumor was anesthetized at the skin surface using 0.5% Marcaine with epinephrine and a longitudinal elliptical skin incision made measuring 1.5 cm.  Dissection was carried down into the subcutaneous fat around the 1 cm glomus tumor which was then passed off to pathology in formalin.  Hemostasis was achieved with electrocautery.  The incision was then closed primarily using interrupted 3-0 Vicryl deep dermal sutures, running 4-0 Vicryl  subcuticular suture, and topical Exofin glue.  He was then transferred to the recovery room in stable condition with all counts correct per nursing.    Rita Mims MD  General, Robotic, and Endoscopic Surgery  [facility]    [address], [address]  [place] [address]  P: [phone]  F: [phone]

## 2023-10-06 NOTE — ANESTHESIA PREPROCEDURE EVALUATION
Anesthesia Evaluation     no history of anesthetic complications:   NPO Solid Status: > 8 hours  NPO Liquid Status: > 2 hours           Airway   Mallampati: II  Neck ROM: full  no difficulty expected  Dental - normal exam     Pulmonary - negative pulmonary ROS and normal exam   (-) COPD, asthma, sleep apnea, not a smoker    PE comment: nonlabored  Cardiovascular - negative cardio ROS and normal exam  Exercise tolerance: good (4-7 METS)    Rhythm: regular  Rate: normal    (+) hypertension, hyperlipidemia  (-) valvular problems/murmurs, past MI, CAD, dysrhythmias, angina      Neuro/Psych- negative ROS  (+) numbness (L hand numbness/tingling)  (-) seizures, TIA, CVA  GI/Hepatic/Renal/Endo - negative ROS   (+) obesity  (-) GERD, liver disease, no renal disease, diabetes, no thyroid disorder    Musculoskeletal (-) negative ROS    (+) neck pain      ROS comment: Glomus tumor R upper arm  Abdominal    Substance History      OB/GYN          Other                      Anesthesia Plan    ASA 2     MAC       Anesthetic plan, risks, benefits, and alternatives have been provided, discussed and informed consent has been obtained with: patient.    CODE STATUS:

## 2023-10-09 LAB
LAB AP CASE REPORT: NORMAL
LAB AP DIAGNOSIS COMMENT: NORMAL
PATH REPORT.FINAL DX SPEC: NORMAL
PATH REPORT.GROSS SPEC: NORMAL

## 2023-10-31 ENCOUNTER — OFFICE VISIT (OUTPATIENT)
Dept: SURGERY | Facility: CLINIC | Age: 58
End: 2023-10-31
Payer: COMMERCIAL

## 2023-10-31 VITALS
HEIGHT: 72 IN | BODY MASS INDEX: 30.91 KG/M2 | SYSTOLIC BLOOD PRESSURE: 128 MMHG | WEIGHT: 228.2 LBS | DIASTOLIC BLOOD PRESSURE: 84 MMHG

## 2023-10-31 DIAGNOSIS — Z09 SURGICAL FOLLOWUP: Primary | ICD-10-CM

## 2023-10-31 DIAGNOSIS — D18.00 GLOMUS TUMOR: ICD-10-CM

## 2023-10-31 PROCEDURE — 99024 POSTOP FOLLOW-UP VISIT: CPT | Performed by: SURGERY

## 2023-10-31 NOTE — PROGRESS NOTES
CHIEF COMPLAINT:   Chief Complaint   Patient presents with    Post-op     Excision right upper arm glomus tumor 10/06/23       HISTORY OF PRESENT ILLNESS:  This is a 58 y.o. male who presents for a post-operative visit after undergoing excision right upper arm glomus tumor on 10/6/2023.  He has been doing very well since surgery with only minimal phantom pains of the right upper arm.  He has had no drainage or erythema from his incision.    Pathology:   Skin, right upper arm (glomus tumor):               A.  Glomus tumor (see comment).  Comment: Immunostains for CD34, CD56 and smooth muscle actin will be performed and reported separately.  Lesional cells show immunoreactivity for actin.  Vascular channels shows CD34 positivity.  Lesional cells are judged negative for CD56.  The lesion is loosely encapsulated and appears completely excised.  This case was shared in consultation with Dr. Telles and , who concurred with the above diagnosis.     PHYSICAL EXAM:  Lungs: Clear  Heart: RRR  ABD: Soft, nontender, nondistended  Ext: Right upper arm incision healing well without erythema, fluctuance, or tenderness to palpation    A/P:  This is a 58 y.o. male patient who is S/P excision right upper arm glomus tumor on 10/6/2023    He is healing well.  I discussed the benign pathology findings with him.  He can follow-up with me on an as-needed basis.    Rita Mims MD  General, Robotic, and Endoscopic Surgery  [facility] [place]    [address], [address]  [place] [address]  P: [phone]  F: [phone]

## 2023-12-01 DIAGNOSIS — E78.5 HYPERLIPIDEMIA, UNSPECIFIED HYPERLIPIDEMIA TYPE: ICD-10-CM

## 2023-12-01 RX ORDER — ATORVASTATIN CALCIUM 10 MG/1
TABLET, FILM COATED ORAL
Qty: 90 TABLET | Refills: 0 | Status: SHIPPED | OUTPATIENT
Start: 2023-12-01

## 2024-02-27 ENCOUNTER — OFFICE VISIT (OUTPATIENT)
Dept: FAMILY MEDICINE CLINIC | Facility: CLINIC | Age: 59
End: 2024-02-27
Payer: COMMERCIAL

## 2024-02-27 VITALS
TEMPERATURE: 97.3 F | HEART RATE: 66 BPM | BODY MASS INDEX: 31.77 KG/M2 | DIASTOLIC BLOOD PRESSURE: 74 MMHG | OXYGEN SATURATION: 98 % | HEIGHT: 72 IN | SYSTOLIC BLOOD PRESSURE: 124 MMHG | WEIGHT: 234.6 LBS

## 2024-02-27 DIAGNOSIS — I10 PRIMARY HYPERTENSION: Primary | ICD-10-CM

## 2024-02-27 DIAGNOSIS — Z12.11 SCREENING FOR COLON CANCER: ICD-10-CM

## 2024-02-27 DIAGNOSIS — J31.0 RHINITIS, CHRONIC: ICD-10-CM

## 2024-02-27 DIAGNOSIS — E78.5 HYPERLIPIDEMIA, UNSPECIFIED HYPERLIPIDEMIA TYPE: ICD-10-CM

## 2024-02-27 RX ORDER — ATORVASTATIN CALCIUM 10 MG/1
10 TABLET, FILM COATED ORAL DAILY
Qty: 90 TABLET | Refills: 3 | Status: SHIPPED | OUTPATIENT
Start: 2024-02-27

## 2024-02-27 RX ORDER — LISINOPRIL 10 MG/1
10 TABLET ORAL DAILY
Qty: 90 TABLET | Refills: 3 | Status: SHIPPED | OUTPATIENT
Start: 2024-02-27

## 2024-02-27 RX ORDER — AZELASTINE HYDROCHLORIDE, FLUTICASONE PROPIONATE 137; 50 UG/1; UG/1
2 SPRAY, METERED NASAL DAILY
Qty: 23 G | Refills: 5 | Status: SHIPPED | OUTPATIENT
Start: 2024-02-27

## 2024-02-27 NOTE — PROGRESS NOTES
Tom Pop is a 58 y.o. male.     Chief Complaint   Patient presents with    Hyperlipidemia    Hypertension       History of Present Illness   Follow-up for hypertension.  Currently, has been feeling well and asymptomatic without any headaches, vision changes, cough, chest pain, shortness of breath, swelling, focal neurologic deficit, memory loss or syncope.  Has been taking the medications regularly and adherent with the regimen of lisinopril 10 mg daily.  Denies medication side effects and no significant interval events.       Follow-up for cholesterol.  Currently, has been feeling well without any myalgias, muscle aches, weakness, numbness, chest pain, short of breath or other issues.  Currently, is adherent with medication regimen of atorvastatin 10 mg and denies medication side effects. Is due for lab follow-up.     The following portions of the patient's history were reviewed and updated as appropriate: allergies, current medications, past family history, past medical history, past social history, past surgical history and problem list.    Depression Screen:      2/27/2024     7:57 AM   PHQ-2/PHQ-9 Depression Screening   Little Interest or Pleasure in Doing Things 0-->not at all   Feeling Down, Depressed or Hopeless 0-->not at all   PHQ-9: Brief Depression Severity Measure Score 0       Past Medical History:   Diagnosis Date    Cervical disc disease     Glomus tumor     RIGHT UPPER ARM    Gout     Hyperlipidemia     Hypertension     Numbness and tingling in left hand     Seasonal allergies        Past Surgical History:   Procedure Laterality Date    APPENDECTOMY      CERVICAL DISCECTOMY POSTERIOR FUSION WITH BRAIN LAB Left 10/21/2021    Procedure: LEFT CERVICAL 7 AND THORACIC 1 POSTERIOR DISCECTOMY WITH METRIX;  Surgeon: Lennox Bermudez MD;  Location: [facility];  Service: Neurosurgery;  Laterality: Left;    EXCISION MASS ARM Right 10/6/2023    Procedure: Excision right upper arm glomus  tumor;  Surgeon: Rita Mims MD;  Location: [facility] OR [facility];  Service: General;  Laterality: Right;    VASECTOMY         Family History   Problem Relation Age of Onset    Diabetes Mother     Heart disease Mother 62    Diabetes Father     Heart disease Father 72    Malig Hyperthermia Neg Hx        Social History     Socioeconomic History    Marital status:    Tobacco Use    Smoking status: Never     Passive exposure: Never    Smokeless tobacco: Never   Vaping Use    Vaping Use: Never used   Substance and Sexual Activity    Alcohol use: Yes     Alcohol/week: 4.0 standard drinks of alcohol     Types: 2 Cans of beer, 2 Shots of liquor per week     Comment: occassionally    Drug use: No    Sexual activity: Yes     Partners: Female       Current Outpatient Medications   Medication Sig Dispense Refill    atorvastatin (LIPITOR) 10 MG tablet Take 1 tablet by mouth Daily. 90 tablet 3    Azelastine-Fluticasone 137-50 MCG/ACT suspension 2 sprays into the nostril(s) as directed by provider Daily. 23 g 5    lisinopril (PRINIVIL,ZESTRIL) 10 MG tablet Take 1 tablet by mouth Daily. 90 tablet 3    Loratadine (CLARITIN PO) Take  by mouth.       No current facility-administered medications for this visit.       Review of Systems   Constitutional:  Negative for activity change, appetite change, fatigue, fever, unexpected weight gain and unexpected weight loss.   HENT:  Negative for nosebleeds, rhinorrhea, trouble swallowing and voice change.    Eyes:  Negative for visual disturbance.   Respiratory:  Negative for cough, chest tightness, shortness of breath and wheezing.    Cardiovascular:  Negative for chest pain, palpitations and leg swelling.   Gastrointestinal:  Negative for abdominal pain, blood in stool, constipation, diarrhea, nausea, vomiting, GERD and indigestion.   Genitourinary:  Negative for dysuria, frequency and hematuria.   Musculoskeletal:  Negative for arthralgias, back pain and myalgias.   Skin:  Negative  "for rash and wound.   Neurological:  Negative for dizziness, tremors, weakness, light-headedness, numbness, headache and memory problem.   Hematological:  Negative for adenopathy. Does not bruise/bleed easily.   Psychiatric/Behavioral:  Negative for sleep disturbance and depressed mood. The patient is not nervous/anxious.        Objective   /74 (BP Location: Left arm, Patient Position: Sitting, Cuff Size: Large Adult)   Pulse 66   Temp 97.3 °F (36.3 °C) (Temporal)   Ht 182.9 cm (72.01\")   Wt 106 kg (234 lb 9.6 oz)   SpO2 98%   BMI 31.81 kg/m²     Physical Exam  Vitals and nursing note reviewed.   Constitutional:       General: He is not in acute distress.     Appearance: He is well-developed. He is not diaphoretic.   HENT:      Head: Normocephalic and atraumatic.      Right Ear: External ear normal.      Left Ear: External ear normal.      Nose: Nose normal.   Eyes:      Conjunctiva/sclera: Conjunctivae normal.      Pupils: Pupils are equal, round, and reactive to light.   Neck:      Thyroid: No thyromegaly.      Trachea: No tracheal deviation.   Cardiovascular:      Rate and Rhythm: Normal rate and regular rhythm.      Heart sounds: Normal heart sounds. No murmur heard.     No friction rub. No gallop.   Pulmonary:      Effort: Pulmonary effort is normal. No respiratory distress.      Breath sounds: Normal breath sounds.   Abdominal:      General: Bowel sounds are normal.      Palpations: Abdomen is soft. There is no mass.      Tenderness: There is no abdominal tenderness. There is no guarding.   Musculoskeletal:         General: Normal range of motion.      Cervical back: Normal range of motion and neck supple.   Lymphadenopathy:      Cervical: No cervical adenopathy.   Skin:     General: Skin is warm and dry.      Capillary Refill: Capillary refill takes less than 2 seconds.      Findings: No rash.   Neurological:      Mental Status: He is alert and oriented to person, place, and time.      Motor: No " abnormal muscle tone.      Deep Tendon Reflexes: Reflexes normal.   Psychiatric:         Behavior: Behavior normal.         Thought Content: Thought content normal.         Judgment: Judgment normal.         No results found for this or any previous visit (from the past 2016 hour(s)).  Assessment & Plan   Diagnoses and all orders for this visit:    1. Primary hypertension (Primary)  -     lisinopril (PRINIVIL,ZESTRIL) 10 MG tablet; Take 1 tablet by mouth Daily.  Dispense: 90 tablet; Refill: 3    2. Rhinitis, chronic  -     Azelastine-Fluticasone 137-50 MCG/ACT suspension; 2 sprays into the nostril(s) as directed by provider Daily.  Dispense: 23 g; Refill: 5    3. Hyperlipidemia, unspecified hyperlipidemia type  -     atorvastatin (LIPITOR) 10 MG tablet; Take 1 tablet by mouth Daily.  Dispense: 90 tablet; Refill: 3    4. Screening for colon cancer  -     Ambulatory Referral For Screening Colonoscopy    Hypertension is well-controlled.  Continue current medication.  He has a chronic rhinitis which was helped with his nose spray will restart.  Continue with his atorvastatin.  We discussed his colon cancer screening which she is going to be needing a repeat colonoscopy.  Patient to follow-up in 6 months which time we will do all labs.           COVID-19 Precautions - Patient was compliant in wearing a mask. When I saw the patient, I used appropriate personal protective equipment (PPE) including mask and eye shield (standard procedure).  Additionally, I used gown and gloves if indicated.  Hand hygiene was completed before and after seeing the patient.  Dictated utilizing Dragon Dictation

## 2024-05-01 ENCOUNTER — OFFICE VISIT (OUTPATIENT)
Dept: FAMILY MEDICINE CLINIC | Facility: CLINIC | Age: 59
End: 2024-05-01
Payer: COMMERCIAL

## 2024-05-01 VITALS
BODY MASS INDEX: 30.99 KG/M2 | HEIGHT: 72 IN | WEIGHT: 228.8 LBS | OXYGEN SATURATION: 95 % | DIASTOLIC BLOOD PRESSURE: 82 MMHG | TEMPERATURE: 97.1 F | HEART RATE: 72 BPM | SYSTOLIC BLOOD PRESSURE: 126 MMHG

## 2024-05-01 DIAGNOSIS — J30.9 ALLERGIC RHINITIS, UNSPECIFIED SEASONALITY, UNSPECIFIED TRIGGER: Primary | ICD-10-CM

## 2024-05-01 PROCEDURE — 99213 OFFICE O/P EST LOW 20 MIN: CPT | Performed by: INTERNAL MEDICINE

## 2024-05-01 RX ORDER — MONTELUKAST SODIUM 10 MG/1
10 TABLET ORAL NIGHTLY
Qty: 90 TABLET | Refills: 3 | Status: SHIPPED | OUTPATIENT
Start: 2024-05-01

## 2024-05-01 NOTE — PROGRESS NOTES
Subjective   Orion Pop is a 58 y.o. male.     Chief Complaint   Patient presents with    Sinus Problem     He has had sinus drainage and cough for a couple of weeks now. He is taking medication to help but it is not going away. He is taking muscinex D and Robitussin DM    Cough       History of Present Illness   Patient sinus drainage and a cough that is been present for 3 weeks now with only clear drainage and mucous.  No SOA, wheezing, F, C, N, LAD.  Has tried over the counter such as Mucinex D and Robitussin DM with no relief.  History of chronic sinus issues in past and was doing well on the azelastine-flonase and claritin till recent.    History of hypertension.  Currently, has been feeling well and asymptomatic without any headaches, vision changes, cough, chest pain, shortness of breath, swelling, focal neurologic deficit, memory loss or syncope.  Has been taking the medications regularly and adherent with the regimen of lisinopril 10 mg daily.  Denies medication side effects and no significant interval events.       History of high cholesterol.  Currently, has been feeling well without any myalgias, muscle aches, weakness, numbness, chest pain, short of breath or other issues.  Currently, is adherent with medication regimen of atorvastatin 10 mg and denies medication side effects. Is due for lab follow-up.      The following portions of the patient's history were reviewed and updated as appropriate: allergies, current medications, past family history, past medical history, past social history, past surgical history and problem list.    Depression Screen:      2/27/2024     7:57 AM   PHQ-2/PHQ-9 Depression Screening   Little Interest or Pleasure in Doing Things 0-->not at all   Feeling Down, Depressed or Hopeless 0-->not at all   PHQ-9: Brief Depression Severity Measure Score 0       Past Medical History:   Diagnosis Date    Cervical disc disease     Glomus tumor     RIGHT UPPER ARM    Gout      Hyperlipidemia     Hypertension     Numbness and tingling in left hand     Seasonal allergies        Past Surgical History:   Procedure Laterality Date    APPENDECTOMY      CERVICAL DISCECTOMY POSTERIOR FUSION WITH BRAIN LAB Left 10/21/2021    Procedure: LEFT CERVICAL 7 AND THORACIC 1 POSTERIOR DISCECTOMY WITH METRIX;  Surgeon: Lennox Bermudez MD;  Location: [facility] OR;  Service: Neurosurgery;  Laterality: Left;    EXCISION MASS ARM Right 10/6/2023    Procedure: Excision right upper arm glomus tumor;  Surgeon: Rita Mims MD;  Location: [facility];  Service: General;  Laterality: Right;    VASECTOMY         Family History   Problem Relation Age of Onset    Diabetes Mother     Heart disease Mother 62    Diabetes Father     Heart disease Father 72    Malig Hyperthermia Neg Hx        Social History     Socioeconomic History    Marital status:    Tobacco Use    Smoking status: Never     Passive exposure: Never    Smokeless tobacco: Never   Vaping Use    Vaping status: Never Used   Substance and Sexual Activity    Alcohol use: Yes     Alcohol/week: 4.0 standard drinks of alcohol     Types: 2 Cans of beer, 2 Shots of liquor per week     Comment: occassionally    Drug use: No    Sexual activity: Yes     Partners: Female       Current Outpatient Medications   Medication Sig Dispense Refill    atorvastatin (LIPITOR) 10 MG tablet Take 1 tablet by mouth Daily. 90 tablet 3    Azelastine-Fluticasone 137-50 MCG/ACT suspension 2 sprays into the nostril(s) as directed by provider Daily. 23 g 5    Dextromethorphan-guaiFENesin (ROBITUSSIN DM PO) Take  by mouth.      lisinopril (PRINIVIL,ZESTRIL) 10 MG tablet Take 1 tablet by mouth Daily. 90 tablet 3    Loratadine (CLARITIN PO) Take  by mouth.      montelukast (Singulair) 10 MG tablet Take 1 tablet by mouth Every Night. 90 tablet 3     No current facility-administered medications for this visit.     Review of Systems   Constitutional:  Negative for activity  "change, appetite change, fatigue, fever, unexpected weight gain and unexpected weight loss.   HENT:  Positive for rhinorrhea. Negative for nosebleeds, trouble swallowing and voice change.    Eyes:  Negative for visual disturbance.   Respiratory:  Positive for cough. Negative for chest tightness, shortness of breath and wheezing.    Cardiovascular:  Negative for chest pain, palpitations and leg swelling.   Gastrointestinal:  Negative for abdominal pain, blood in stool, constipation, diarrhea, nausea, vomiting, GERD and indigestion.   Genitourinary:  Negative for dysuria, frequency and hematuria.   Musculoskeletal:  Negative for arthralgias, back pain and myalgias.   Skin:  Negative for rash and wound.   Neurological:  Negative for dizziness, tremors, weakness, light-headedness, numbness, headache and memory problem.   Hematological:  Negative for adenopathy. Does not bruise/bleed easily.   Psychiatric/Behavioral:  Negative for sleep disturbance and depressed mood. The patient is not nervous/anxious.        Objective   /82 (BP Location: Left arm, Patient Position: Sitting, Cuff Size: Adult)   Pulse 72   Temp 97.1 °F (36.2 °C) (Temporal)   Ht 182.9 cm (72.01\")   Wt 104 kg (228 lb 12.8 oz)   SpO2 95%   BMI 31.02 kg/m²     Physical Exam  Vitals and nursing note reviewed.   Constitutional:       General: He is not in acute distress.     Appearance: He is well-developed. He is not diaphoretic.   HENT:      Head: Normocephalic and atraumatic.      Right Ear: External ear normal.      Left Ear: External ear normal.      Nose: Nose normal.   Eyes:      Conjunctiva/sclera: Conjunctivae normal.      Pupils: Pupils are equal, round, and reactive to light.   Neck:      Thyroid: No thyromegaly.      Trachea: No tracheal deviation.   Cardiovascular:      Rate and Rhythm: Normal rate and regular rhythm.      Heart sounds: Normal heart sounds. No murmur heard.     No friction rub. No gallop.   Pulmonary:      Effort: " Pulmonary effort is normal. No respiratory distress.      Breath sounds: Normal breath sounds.   Abdominal:      General: Bowel sounds are normal.      Palpations: Abdomen is soft. There is no mass.      Tenderness: There is no abdominal tenderness. There is no guarding.   Musculoskeletal:         General: Normal range of motion.      Cervical back: Normal range of motion and neck supple.   Lymphadenopathy:      Cervical: No cervical adenopathy.   Skin:     General: Skin is warm and dry.      Capillary Refill: Capillary refill takes less than 2 seconds.      Findings: No rash.   Neurological:      Mental Status: He is alert and oriented to person, place, and time.      Motor: No abnormal muscle tone.      Deep Tendon Reflexes: Reflexes normal.   Psychiatric:         Behavior: Behavior normal.         Thought Content: Thought content normal.         Judgment: Judgment normal.         No results found for this or any previous visit (from the past 2016 hour(s)).  Assessment & Plan   Diagnoses and all orders for this visit:    1. Allergic rhinitis, unspecified seasonality, unspecified trigger (Primary)  -     montelukast (Singulair) 10 MG tablet; Take 1 tablet by mouth Every Night.  Dispense: 90 tablet; Refill: 3    Patient with an exacerbation of his allergic rhinitis.  Currently is already on the azelastine, Flonase, and Claritin.  Will add Singulair.  If he does not improve then will refer to allergist for evaluation.  He is watch for signs such as fevers chills nausea vomiting blood from nose for infections though I do not believe he has any at this time.           COVID-19 Precautions - Patient was compliant in wearing a mask. When I saw the patient, I used appropriate personal protective equipment (PPE) including mask and eye shield (standard procedure).  Additionally, I used gown and gloves if indicated.  Hand hygiene was completed before and after seeing the patient.  Dictated utilizing Dragon Dictation

## 2024-05-31 ENCOUNTER — TELEPHONE (OUTPATIENT)
Dept: GASTROENTEROLOGY | Facility: CLINIC | Age: 59
End: 2024-05-31
Payer: COMMERCIAL

## 2024-05-31 NOTE — TELEPHONE ENCOUNTER
NO PERSONAL HX OF POLYPS    NO FAMILY HX OF POLYPS    NO FAMILY HX OF COLON CA            LIST OF  MEDICATIONS    ATORVASTATIN  LISINOPRIL  MONTELUKAST  CLARITIN  VITAMIN C             OA QUESTIONNAIRE SCANNED IN MEDIA

## 2024-06-11 DIAGNOSIS — Z12.11 ENCOUNTER FOR SCREENING FOR MALIGNANT NEOPLASM OF COLON: Primary | ICD-10-CM

## 2024-06-11 RX ORDER — SODIUM CHLORIDE, SODIUM LACTATE, POTASSIUM CHLORIDE, CALCIUM CHLORIDE 600; 310; 30; 20 MG/100ML; MG/100ML; MG/100ML; MG/100ML
30 INJECTION, SOLUTION INTRAVENOUS CONTINUOUS
OUTPATIENT
Start: 2024-06-11

## 2024-06-13 ENCOUNTER — TELEPHONE (OUTPATIENT)
Dept: GASTROENTEROLOGY | Facility: CLINIC | Age: 59
End: 2024-06-13
Payer: COMMERCIAL

## 2024-08-28 ENCOUNTER — OFFICE VISIT (OUTPATIENT)
Dept: FAMILY MEDICINE CLINIC | Facility: CLINIC | Age: 59
End: 2024-08-28
Payer: COMMERCIAL

## 2024-08-28 VITALS
HEIGHT: 72 IN | HEART RATE: 55 BPM | TEMPERATURE: 98.7 F | WEIGHT: 216 LBS | BODY MASS INDEX: 29.26 KG/M2 | DIASTOLIC BLOOD PRESSURE: 68 MMHG | SYSTOLIC BLOOD PRESSURE: 116 MMHG | OXYGEN SATURATION: 92 %

## 2024-08-28 DIAGNOSIS — E78.5 HYPERLIPIDEMIA, UNSPECIFIED HYPERLIPIDEMIA TYPE: ICD-10-CM

## 2024-08-28 DIAGNOSIS — J31.0 RHINITIS, CHRONIC: ICD-10-CM

## 2024-08-28 DIAGNOSIS — Z12.5 SPECIAL SCREENING, PROSTATE CANCER: ICD-10-CM

## 2024-08-28 DIAGNOSIS — Z00.00 ANNUAL PHYSICAL EXAM: Primary | ICD-10-CM

## 2024-08-28 DIAGNOSIS — I10 PRIMARY HYPERTENSION: ICD-10-CM

## 2024-08-28 PROCEDURE — 99396 PREV VISIT EST AGE 40-64: CPT | Performed by: INTERNAL MEDICINE
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
## 2024-08-29 LAB
ALBUMIN SERPL-MCNC: 4.7 G/DL (ref 3.5–5.2)
ALBUMIN/GLOB SERPL: 1.9 G/DL
ALP SERPL-CCNC: 69 U/L (ref 39–117)
ALT SERPL-CCNC: 23 U/L (ref 1–41)
AST SERPL-CCNC: 22 U/L (ref 1–40)
BILIRUB SERPL-MCNC: 0.8 MG/DL (ref 0–1.2)
BUN SERPL-MCNC: 11 MG/DL (ref 8–23)
BUN/CREAT SERPL: 12.6 (ref 7–25)
CALCIUM SERPL-MCNC: 9.9 MG/DL (ref 8.6–10.5)
CHLORIDE SERPL-SCNC: 103 MMOL/L (ref 98–107)
CHOLEST SERPL-MCNC: 141 MG/DL (ref 0–200)
CO2 SERPL-SCNC: 25 MMOL/L (ref 22–29)
CREAT SERPL-MCNC: 0.87 MG/DL (ref 0.76–1.27)
EGFRCR SERPLBLD CKD-EPI 2021: 98.2 ML/MIN/1.73
GLOBULIN SER CALC-MCNC: 2.5 GM/DL
GLUCOSE SERPL-MCNC: 107 MG/DL (ref 65–99)
HDLC SERPL-MCNC: 42 MG/DL (ref 40–60)
LDLC SERPL CALC-MCNC: 83 MG/DL (ref 0–100)
POTASSIUM SERPL-SCNC: 4.7 MMOL/L (ref 3.5–5.2)
PROT SERPL-MCNC: 7.2 G/DL (ref 6–8.5)
PSA SERPL-MCNC: 2.24 NG/ML (ref 0–4)
SODIUM SERPL-SCNC: 139 MMOL/L (ref 136–145)
TRIGL SERPL-MCNC: 85 MG/DL (ref 0–150)
VLDLC SERPL CALC-MCNC: 16 MG/DL (ref 5–40)

## 2024-09-10 DIAGNOSIS — M1A.0790 IDIOPATHIC CHRONIC GOUT OF FOOT WITHOUT TOPHUS, UNSPECIFIED LATERALITY: ICD-10-CM

## 2024-09-10 DIAGNOSIS — M10.9 ACUTE GOUT INVOLVING TOE OF LEFT FOOT, UNSPECIFIED CAUSE: ICD-10-CM

## 2024-09-10 RX ORDER — COLCHICINE 0.6 MG/1
TABLET ORAL
Qty: 10 TABLET | Refills: 0 | Status: SHIPPED | OUTPATIENT
Start: 2024-09-10

## 2025-03-13 DIAGNOSIS — I10 PRIMARY HYPERTENSION: ICD-10-CM

## 2025-03-13 RX ORDER — LISINOPRIL 10 MG/1
10 TABLET ORAL DAILY
Qty: 90 TABLET | Refills: 1 | Status: SHIPPED | OUTPATIENT
Start: 2025-03-13

## 2025-04-04 DIAGNOSIS — E78.5 HYPERLIPIDEMIA, UNSPECIFIED HYPERLIPIDEMIA TYPE: ICD-10-CM

## 2025-04-04 RX ORDER — ATORVASTATIN CALCIUM 10 MG/1
10 TABLET, FILM COATED ORAL DAILY
Qty: 90 TABLET | Refills: 3 | Status: SHIPPED | OUTPATIENT
Start: 2025-04-04

## 2025-05-14 DIAGNOSIS — J30.9 ALLERGIC RHINITIS, UNSPECIFIED SEASONALITY, UNSPECIFIED TRIGGER: ICD-10-CM

## 2025-05-14 RX ORDER — MONTELUKAST SODIUM 10 MG/1
10 TABLET ORAL
Qty: 90 TABLET | Refills: 0 | Status: SHIPPED | OUTPATIENT
Start: 2025-05-14

## 2025-07-02 ENCOUNTER — TELEPHONE (OUTPATIENT)
Dept: GASTROENTEROLOGY | Facility: CLINIC | Age: 60
End: 2025-07-02
Payer: COMMERCIAL

## 2025-07-02 DIAGNOSIS — Z12.11 ENCOUNTER FOR SCREENING FOR MALIGNANT NEOPLASM OF COLON: Primary | ICD-10-CM

## 2025-07-02 RX ORDER — SODIUM CHLORIDE, SODIUM LACTATE, POTASSIUM CHLORIDE, CALCIUM CHLORIDE 600; 310; 30; 20 MG/100ML; MG/100ML; MG/100ML; MG/100ML
30 INJECTION, SOLUTION INTRAVENOUS CONTINUOUS
OUTPATIENT
Start: 2025-07-02 | End: 2025-07-02

## 2025-07-02 NOTE — TELEPHONE ENCOUNTER
Screening colonoscopy    No personal or family hx polyps  No family hx cx    Asa or blood thinners:  None    List medications:  Lisinopril  Atorvastatin   Montelukast  Claritin otc    OA form scanned in media

## 2025-07-07 NOTE — TELEPHONE ENCOUNTER
PINA Perdue for COLONOSCOPY on 10/6/25  arrive at  12:00 . Sent prep instructions to pt mail address ....[place]

## 2025-08-13 DIAGNOSIS — J30.9 ALLERGIC RHINITIS, UNSPECIFIED SEASONALITY, UNSPECIFIED TRIGGER: ICD-10-CM

## 2025-08-13 RX ORDER — MONTELUKAST SODIUM 10 MG/1
10 TABLET ORAL
Qty: 90 TABLET | Refills: 0 | Status: SHIPPED | OUTPATIENT
Start: 2025-08-13

## (undated) DEVICE — DRP C/ARM 41X74IN

## (undated) DEVICE — GLV SURG PREMIERPRO ORTHO LTX PF SZ8 BRN

## (undated) DEVICE — ELECTRD BLD EZ CLN MOD XLNG 2.75IN

## (undated) DEVICE — 3M™ STERI-STRIP™ REINFORCED ADHESIVE SKIN CLOSURES, R1547, 1/2 IN X 4 IN (12 MM X 100 MM), 6 STRIPS/ENVELOPE: Brand: 3M™ STERI-STRIP™

## (undated) DEVICE — ANTIBACTERIAL UNDYED BRAIDED (POLYGLACTIN 910), SYNTHETIC ABSORBABLE SUTURE: Brand: COATED VICRYL

## (undated) DEVICE — GLV SURG BIOGEL LTX PF 6 1/2

## (undated) DEVICE — DRSNG SURESITE WNDW 4X4.5

## (undated) DEVICE — GLV SURG SENSICARE POLYISPRN W/ALOE PF LF 6.5 GRN STRL

## (undated) DEVICE — DRP MICROSCOPE 4 BINOCULAR CV 54X150IN

## (undated) DEVICE — PATIENT RETURN ELECTRODE, SINGLE-USE, CONTACT QUALITY MONITORING, ADULT, WITH 9FT CORD, FOR PATIENTS WEIGING OVER 33LBS. (15KG): Brand: MEGADYNE

## (undated) DEVICE — NEEDLE, QUINCKE 22GX3.5": Brand: MEDLINE INDUSTRIES, INC.

## (undated) DEVICE — APPL CHLORAPREP HI/LITE 26ML ORNG

## (undated) DEVICE — PENCL ES MEGADINE EZ/CLEAN BUTN W/HOLSTR 10FT

## (undated) DEVICE — GOWN,NON-REINFORCED,SIRUS,SET IN SLV,XXL: Brand: MEDLINE

## (undated) DEVICE — ELECTRD BLD EZ CLN STD 6.5IN

## (undated) DEVICE — DRSNG TELFA PAD NONADH STR 1S 3X4IN

## (undated) DEVICE — TOWEL,OR,DSP,ST,BLUE,STD,4/PK,20PK/CS: Brand: MEDLINE

## (undated) DEVICE — TOOL MR8-15MH22 MR8 15CM MATCH 2.2MM: Brand: MIDAS REX MR8

## (undated) DEVICE — SUT VIC 4/0 P3 18IN J494G

## (undated) DEVICE — DISPOSABLE BIPOLAR FORCEPS 7 3/4" (19.7CM) SCOVILLE BAYONET, 1.5MM TIP AND 12 FT. (3.6M) CABLE: Brand: KIRWAN

## (undated) DEVICE — GLV SURG BIOGEL LTX PF 6

## (undated) DEVICE — Device

## (undated) DEVICE — PK PROC MINOR TOWER 40

## (undated) DEVICE — SYR CONTRL LUERLOK 10CC

## (undated) DEVICE — PK NEURO SPINE 40